# Patient Record
Sex: MALE | Race: WHITE
[De-identification: names, ages, dates, MRNs, and addresses within clinical notes are randomized per-mention and may not be internally consistent; named-entity substitution may affect disease eponyms.]

---

## 2017-04-05 ENCOUNTER — HOSPITAL ENCOUNTER (OUTPATIENT)
Dept: HOSPITAL 75 - CARD | Age: 52
End: 2017-04-05
Attending: INTERNAL MEDICINE
Payer: COMMERCIAL

## 2017-04-05 VITALS — DIASTOLIC BLOOD PRESSURE: 64 MMHG | SYSTOLIC BLOOD PRESSURE: 114 MMHG

## 2017-04-05 VITALS — DIASTOLIC BLOOD PRESSURE: 78 MMHG | SYSTOLIC BLOOD PRESSURE: 118 MMHG

## 2017-04-05 VITALS — SYSTOLIC BLOOD PRESSURE: 155 MMHG | DIASTOLIC BLOOD PRESSURE: 73 MMHG

## 2017-04-05 VITALS — DIASTOLIC BLOOD PRESSURE: 64 MMHG | SYSTOLIC BLOOD PRESSURE: 112 MMHG

## 2017-04-05 DIAGNOSIS — Z72.0: ICD-10-CM

## 2017-04-05 DIAGNOSIS — I10: ICD-10-CM

## 2017-04-05 DIAGNOSIS — I25.10: Primary | ICD-10-CM

## 2017-04-05 DIAGNOSIS — E78.2: ICD-10-CM

## 2017-04-05 DIAGNOSIS — R07.89: ICD-10-CM

## 2017-04-05 PROCEDURE — 93017 CV STRESS TEST TRACING ONLY: CPT

## 2017-04-05 PROCEDURE — 78452 HT MUSCLE IMAGE SPECT MULT: CPT

## 2017-04-05 PROCEDURE — 93306 TTE W/DOPPLER COMPLETE: CPT

## 2017-04-05 NOTE — ECHOCARDIOGRAPHY REPORT
PROCEDURE PHYSICIAN:   JACY BYNUM

 

DATE OF PROCEDURE:  

04/05/2017

 

TWO DIMENSIONAL ECHOCARDIOGRAM REPORT 

 

PRIMARY PHYSICIAN:       

OTHER PHYSICIAN:         

REFERRING PHYSICIAN:          

ORDERING PHYSICIAN:      

 

INDICATION FOR THE PROCEDURE:  Chest pain.

 

MEASUREMENTS                  DERIVED VALUES 

 

LV DIAMETER (LAX)   NORMALS                       NORMALS

Diastolic      4    (3.6-5.2)      Eject. Fract.  50% (60%+/-6%)

      

Systolic            (2.3-3.9)      Diastolic Vol.      

% Shortening        (0.22-0.42)    Systolic Vol.       

                                   Aortic Root         

IVS THICKNESS 

Diastolic      0.9  (0.6-1.1)

 

LVPW THICKNESS 

Diastolic      0.9  (0.6-1.1)

 

LA DIAMETER 

Systolic       2.2  (2.1-3.7)  

 

FINDINGS:

1.   Technical quality is good. 

2.   The left ventricle is normal in size with normal

contractility. Systolic function appeared to be normal. Estimated

ejection fraction 50%. 

3.   The left atrium is normal in size. No clot or thrombus were

seen within the left atrium. 

4.   The right atrium and right ventricle are normal in size. No

clot or thrombus were seen within the right side. 

5.   Mitral valve is normal in morphology with mild mitral

regurgitation noted by color Doppler flow. No mitral valve

prolapse. No mitral valve stenosis. 

6.   Aortic valve is trileaflet with normal opening and closing

pattern. No significant aortic stenosis or regurgitation was

seen. 

7.   Tricuspid valve is normal in morphology with mild tricuspid

regurgitation noted by color Doppler flow. Doppler across

tricuspid valve estimated pulmonary artery pressure of 6+ right

atrial pressure. 

8.   Pulmonic valve is functioning normally. 

9.   No pericardial effusion. 

 

CONCLUSION:

1.   Normal left ventricular size and systolic function.

Estimated ejection fraction 50%. 

2.   Mild mitral and tricuspid regurgitation. 

3.   Estimated pulmonary artery pressure of 15 mmHg.  

 

 

 

Job ID: [id number]

Dictated Date: 04/05/2017 14:36:56 

Transcription Date: 04/05/2017 15:00:01 / cha

## 2017-04-05 NOTE — STRESS TEST
PROCEDURE PHYSICIAN:   JACY BYNUM

 

DATE OF PROCEDURE:  

04/05/2017

 

EXERCISE MYOVIEW STRESS TEST REPORT 

 

BASELINE HEART RATE IS 71.  

BASELINE BLOOD PRESSURE: 107/71. 

BASELINE EKG: Sinus rhythm with no ischemic changes. 

 

SUMMARY:

The patient was injected with 10.89 mCi of technetium 99 Myoview

and the resting images were obtained. Then the patient started

exercising with a baseline heart rate, blood pressure and EKG

mentioned above. At minute 9 and 30 seconds, the patient was

injected with 32.5 mCi of technetium 99 Myoview. The patient was

able to finish a total of 10 minutes 30 seconds on standard Shane

protocol. With peak exercise level, EKG was showing minimal

nondiagnostic changes. Blood pressure was 154/73. During

recovery, heart rate and blood pressure returned to baseline. EKG

returned to baseline. 

 

The resting and stress images were reviewed in the short axis,

horizontal long axis, and vertical long axis views. Review of the

images showed diaphragmatic attenuation with typical male

pattern. Good radiotracer uptake with no significant ischemia or

infarction. SSS is 1, SDS 1, TID value 1. On the gated images,

the left ventricle appeared to be normal size with normal

contractility. Calculated ejection fraction 55%. 

 

CONCLUSION:

1.   Excellent exercise tolerance a total of 10 minutes 30

seconds on standard Shane protocol. Total of 12.8 METs, achieving

92% of maximum expected heart rate. 

2.   Appropriate heart rate and blood pressure response to

exercise. Returned to baseline during recovery. 

3.   Minimal nondiagnostic EKG changes with exercise. Returned to

baseline during recovery. 

4.   No ischemia or infarction on SPECT images. 

5.   Normal left ventricular size with normal contractility.

Calculated ejection fraction 55%. 

 

 

 

Job ID: [id number]

Dictated Date: 04/05/2017 14:04:52 

Transcription Date: 04/05/2017 14:51:50 / cha

## 2017-12-05 ENCOUNTER — HOSPITAL ENCOUNTER (OUTPATIENT)
Dept: HOSPITAL 75 - ER | Age: 52
Setting detail: OBSERVATION
Discharge: HOME | End: 2017-12-05
Attending: FAMILY MEDICINE | Admitting: FAMILY MEDICINE
Payer: SELF-PAY

## 2017-12-05 VITALS — WEIGHT: 143 LBS | HEIGHT: 67 IN | BODY MASS INDEX: 22.44 KG/M2

## 2017-12-05 VITALS — SYSTOLIC BLOOD PRESSURE: 108 MMHG | DIASTOLIC BLOOD PRESSURE: 74 MMHG

## 2017-12-05 VITALS — DIASTOLIC BLOOD PRESSURE: 71 MMHG | SYSTOLIC BLOOD PRESSURE: 106 MMHG

## 2017-12-05 DIAGNOSIS — I25.2: ICD-10-CM

## 2017-12-05 DIAGNOSIS — Z95.5: ICD-10-CM

## 2017-12-05 DIAGNOSIS — Z79.82: ICD-10-CM

## 2017-12-05 DIAGNOSIS — Z91.19: ICD-10-CM

## 2017-12-05 DIAGNOSIS — R07.9: Primary | ICD-10-CM

## 2017-12-05 DIAGNOSIS — F17.210: ICD-10-CM

## 2017-12-05 DIAGNOSIS — I25.10: ICD-10-CM

## 2017-12-05 DIAGNOSIS — E78.5: ICD-10-CM

## 2017-12-05 DIAGNOSIS — K21.9: ICD-10-CM

## 2017-12-05 DIAGNOSIS — I10: ICD-10-CM

## 2017-12-05 LAB
ALBUMIN SERPL-MCNC: 3.9 GM/DL (ref 3.2–4.5)
ALBUMIN SERPL-MCNC: 4.2 GM/DL (ref 3.2–4.5)
ALT SERPL-CCNC: 15 U/L (ref 0–55)
ALT SERPL-CCNC: 16 U/L (ref 0–55)
ANION GAP SERPL CALC-SCNC: 7 MMOL/L (ref 5–14)
ANION GAP SERPL CALC-SCNC: 8 MMOL/L (ref 5–14)
APTT BLD: 33 SEC (ref 24–35)
AST SERPL-CCNC: 16 U/L (ref 5–34)
AST SERPL-CCNC: 17 U/L (ref 5–34)
BASOPHILS # BLD AUTO: 0 10^3/UL (ref 0–0.1)
BASOPHILS # BLD AUTO: 0 10^3/UL (ref 0–0.1)
BASOPHILS NFR BLD AUTO: 0 % (ref 0–10)
BASOPHILS NFR BLD AUTO: 0 % (ref 0–10)
BILIRUB SERPL-MCNC: 0.3 MG/DL (ref 0.1–1)
BILIRUB SERPL-MCNC: 0.6 MG/DL (ref 0.1–1)
BUN SERPL-MCNC: 14 MG/DL (ref 7–18)
BUN SERPL-MCNC: 17 MG/DL (ref 7–18)
BUN/CREAT SERPL: 14
BUN/CREAT SERPL: 15
CALCIUM SERPL-MCNC: 9 MG/DL (ref 8.5–10.1)
CALCIUM SERPL-MCNC: 9.4 MG/DL (ref 8.5–10.1)
CHLORIDE SERPL-SCNC: 104 MMOL/L (ref 98–107)
CHLORIDE SERPL-SCNC: 106 MMOL/L (ref 98–107)
CHOLEST SERPL-MCNC: 148 MG/DL (ref ?–200)
CK SERPL-CCNC: 127 U/L (ref 30–200)
CO2 SERPL-SCNC: 26 MMOL/L (ref 21–32)
CO2 SERPL-SCNC: 27 MMOL/L (ref 21–32)
CREAT SERPL-MCNC: 0.95 MG/DL (ref 0.6–1.3)
CREAT SERPL-MCNC: 1.2 MG/DL (ref 0.6–1.3)
EOSINOPHIL # BLD AUTO: 0.1 10^3/UL (ref 0–0.3)
EOSINOPHIL # BLD AUTO: 0.1 10^3/UL (ref 0–0.3)
EOSINOPHIL NFR BLD AUTO: 1 % (ref 0–10)
EOSINOPHIL NFR BLD AUTO: 1 % (ref 0–10)
ERYTHROCYTE [DISTWIDTH] IN BLOOD BY AUTOMATED COUNT: 13.4 % (ref 10–14.5)
ERYTHROCYTE [DISTWIDTH] IN BLOOD BY AUTOMATED COUNT: 13.5 % (ref 10–14.5)
GFR SERPLBLD BASED ON 1.73 SQ M-ARVRAT: > 60 ML/MIN
GFR SERPLBLD BASED ON 1.73 SQ M-ARVRAT: > 60 ML/MIN
GLUCOSE SERPL-MCNC: 114 MG/DL (ref 70–105)
GLUCOSE SERPL-MCNC: 161 MG/DL (ref 70–105)
INR PPP: 0.9 (ref 0.8–1.4)
LDLC SERPL DIRECT ASSAY-MCNC: 110 MG/DL (ref 1–129)
LIPASE SERPL-CCNC: 27 U/L (ref 8–78)
LYMPHOCYTES # BLD AUTO: 2.4 X 10^3 (ref 1–4)
LYMPHOCYTES # BLD AUTO: 2.9 X 10^3 (ref 1–4)
LYMPHOCYTES NFR BLD AUTO: 27 % (ref 12–44)
LYMPHOCYTES NFR BLD AUTO: 28 % (ref 12–44)
MAGNESIUM SERPL-MCNC: 1.8 MG/DL (ref 1.8–2.4)
MCH RBC QN AUTO: 31 PG (ref 25–34)
MCH RBC QN AUTO: 31 PG (ref 25–34)
MCHC RBC AUTO-ENTMCNC: 34 G/DL (ref 32–36)
MCHC RBC AUTO-ENTMCNC: 35 G/DL (ref 32–36)
MCV RBC AUTO: 88 FL (ref 80–99)
MCV RBC AUTO: 89 FL (ref 80–99)
MONOCYTES # BLD AUTO: 0.5 X 10^3 (ref 0–1)
MONOCYTES # BLD AUTO: 0.7 X 10^3 (ref 0–1)
MONOCYTES NFR BLD AUTO: 5 % (ref 0–12)
MONOCYTES NFR BLD AUTO: 8 % (ref 0–12)
MYOGLOBIN SERPL-MCNC: 33.5 NG/ML (ref 10–92)
MYOGLOBIN SERPL-MCNC: 38.1 NG/ML (ref 10–92)
NEUTROPHILS # BLD AUTO: 5.8 X 10^3 (ref 1.8–7.8)
NEUTROPHILS # BLD AUTO: 6.7 X 10^3 (ref 1.8–7.8)
NEUTROPHILS NFR BLD AUTO: 64 % (ref 42–75)
NEUTROPHILS NFR BLD AUTO: 66 % (ref 42–75)
PLATELET # BLD: 173 10^3/UL (ref 130–400)
PLATELET # BLD: 186 10^3/UL (ref 130–400)
PMV BLD AUTO: 10 FL (ref 7.4–10.4)
PMV BLD AUTO: 9.9 FL (ref 7.4–10.4)
POTASSIUM SERPL-SCNC: 4 MMOL/L (ref 3.6–5)
POTASSIUM SERPL-SCNC: 4.6 MMOL/L (ref 3.6–5)
PROT SERPL-MCNC: 6.5 GM/DL (ref 6.4–8.2)
PROT SERPL-MCNC: 7.1 GM/DL (ref 6.4–8.2)
PROTHROMBIN TIME: 12.3 SEC (ref 12.2–14.7)
RBC # BLD AUTO: 4.82 10^6/UL (ref 4.35–5.85)
RBC # BLD AUTO: 4.99 10^6/UL (ref 4.35–5.85)
SODIUM SERPL-SCNC: 138 MMOL/L (ref 135–145)
SODIUM SERPL-SCNC: 140 MMOL/L (ref 135–145)
TRIGL SERPL-MCNC: 50 MG/DL (ref ?–150)
TROPONIN I SERPL-MCNC: < 0.3 NG/ML (ref ?–0.3)
VLDLC SERPL CALC-MCNC: 10 MG/DL (ref 5–40)
WBC # BLD AUTO: 10.2 10^3/UL (ref 4.3–11)
WBC # BLD AUTO: 9 10^3/UL (ref 4.3–11)

## 2017-12-05 PROCEDURE — 80061 LIPID PANEL: CPT

## 2017-12-05 PROCEDURE — 93005 ELECTROCARDIOGRAM TRACING: CPT

## 2017-12-05 PROCEDURE — 84484 ASSAY OF TROPONIN QUANT: CPT

## 2017-12-05 PROCEDURE — 83874 ASSAY OF MYOGLOBIN: CPT

## 2017-12-05 PROCEDURE — 93041 RHYTHM ECG TRACING: CPT

## 2017-12-05 PROCEDURE — 71010: CPT

## 2017-12-05 PROCEDURE — 36415 COLL VENOUS BLD VENIPUNCTURE: CPT

## 2017-12-05 PROCEDURE — 80053 COMPREHEN METABOLIC PANEL: CPT

## 2017-12-05 PROCEDURE — 83735 ASSAY OF MAGNESIUM: CPT

## 2017-12-05 PROCEDURE — 85379 FIBRIN DEGRADATION QUANT: CPT

## 2017-12-05 PROCEDURE — 85610 PROTHROMBIN TIME: CPT

## 2017-12-05 PROCEDURE — 83690 ASSAY OF LIPASE: CPT

## 2017-12-05 PROCEDURE — 85730 THROMBOPLASTIN TIME PARTIAL: CPT

## 2017-12-05 PROCEDURE — 82550 ASSAY OF CK (CPK): CPT

## 2017-12-05 PROCEDURE — 85025 COMPLETE CBC W/AUTO DIFF WBC: CPT

## 2017-12-05 RX ADMIN — NITROGLYCERIN PRN MG: 0.4 TABLET SUBLINGUAL at 10:04

## 2017-12-05 RX ADMIN — NITROGLYCERIN PRN MG: 0.4 TABLET SUBLINGUAL at 09:51

## 2017-12-05 NOTE — CLINIC ACCOUNT PROGRESS/DX
Clinic Account Progress/Dx


DIAGNOSIS:


Date Seen by Provider:  Dec 5, 2017


Time Seen by Provider:  16:57


Chest pain nonspecific etiology


Coronary artery disease


Hypertension


Hyperlipidemia 


Tobaccoism














JACY BYNUM MD Dec 5, 2017 16:57

## 2017-12-05 NOTE — CONSULTATION-CARDIOLOGY
HPI-Cardiology


Cardiology Consultation


Date of Consultation


17


Date of Admission





Time Seen by Provider:  15:18


Indication:  chest pain





HPI


52 years old gentleman with history of coronary artery disease, was in his 

usual state of health until this morning when he started having chest pain 

described it as dull in nature in the retrosternal area radiating to the left 

side.  Came into the emergency room and given sublingual nitroglycerin 2 pills 

relieved his chest pain and since then no further episodes were reported, 

denied any associated symptoms of shortness of breath, palpitation, syncope or 

near syncopal episodes.  Has been an active smoker.





Home Medications & Allergies


Allergies:  


Uncoded Allergies:  


     bee sting (Adverse Reaction, Intermediate, swelling, 13)


Home Medication List Reviewed:  Yes





PMH-Social-Family Hx


Patient Social History


Marital Status:  


Employed/Student:  employed


Alcohol Use:  Occasionally Uses


Recreational Drug Use:  No


Smoking Status:  Current Everyday Smoker


Type Used:  Cigarettes


Recent Foreign Travel:  No


Recent Infectious Disease Expo:  No


Physical Abuse Screen:  No


Sexual Abuse:  No





Immunizations Up To Date


Tetanus Booster (TDap):  Less than 5yrs





Past Medical History


past medical history as discussed below





Family Medical History


Significant Family History:  No Pertinent Family Hx


Family Medical Hx


noncontributory to her current condition





Constitutional:  no symptoms reported, see HPI


EENTM:  see HPI, no symptoms reported


Respiratory:  no symptoms reported, see HPI


Cardiovascular:  see HPI, chest pain, No edema, No Hx of Intervention, No 

palpitations, No syncope, No vascular heart diseas, No other


Gastrointestinal:  no symptoms reported, see HPI


Genitourinary:  no symptoms reported, see HPI


Musculoskeletal:  no symptoms reported, see HPI


Skin:  no symptoms reported, see HPI


Psychiatric/Neurological:  No Symptoms Reported, See HPI





Reviewed Test Results


Reviewed Test Results


Lab





Laboratory Tests








Test


  17


09:46 Range/Units


 


 


White Blood Count


  9.0 


  4.3-11.0


10^3/uL


 


Red Blood Count


  4.99 


  4.35-5.85


10^6/uL


 


Hemoglobin 15.5  13.3-17.7  G/DL


 


Hematocrit 44  40-54  %


 


Mean Corpuscular Volume 88  80-99  FL


 


Mean Corpuscular Hemoglobin 31  25-34  PG


 


Mean Corpuscular Hemoglobin


Concent 35 


  32-36  G/DL


 


 


Red Cell Distribution Width 13.4  10.0-14.5  %


 


Platelet Count


  186 


  130-400


10^3/uL


 


Mean Platelet Volume 9.9  7.4-10.4  FL


 


Neutrophils (%) (Auto) 64  42-75  %


 


Lymphocytes (%) (Auto) 27  12-44  %


 


Monocytes (%) (Auto) 8  0-12  %


 


Eosinophils (%) (Auto) 1  0-10  %


 


Basophils (%) (Auto) 0  0-10  %


 


Neutrophils # (Auto) 5.8  1.8-7.8  X 10^3


 


Lymphocytes # (Auto) 2.4  1.0-4.0  X 10^3


 


Monocytes # (Auto) 0.7  0.0-1.0  X 10^3


 


Eosinophils # (Auto)


  0.1 


  0.0-0.3


10^3/uL


 


Basophils # (Auto)


  0.0 


  0.0-0.1


10^3/uL


 


Prothrombin Time 12.3  12.2-14.7  SEC


 


INR Comment 0.9  0.8-1.4  


 


Activated Partial


Thromboplast Time 33 


  24-35  SEC


 


 


D-Dimer


  0.28 


  0.00-0.49


UG/ML


 


Sodium Level 138  135-145  MMOL/L


 


Potassium Level 4.0  3.6-5.0  MMOL/L


 


Chloride Level 104    MMOL/L


 


Carbon Dioxide Level 26  21-32  MMOL/L


 


Anion Gap 8  5-14  MMOL/L


 


Blood Urea Nitrogen 17  7-18  MG/DL


 


Creatinine


  1.20 


  0.60-1.30


MG/DL


 


Estimat Glomerular Filtration


Rate > 60 


   


 


 


BUN/Creatinine Ratio 14   


 


Glucose Level 161 H   MG/DL


 


Calcium Level 9.4  8.5-10.1  MG/DL


 


Magnesium Level 1.8  1.8-2.4  MG/DL


 


Total Bilirubin 0.3  0.1-1.0  MG/DL


 


Aspartate Amino Transf


(AST/SGOT) 17 


  5-34  U/L


 


 


Alanine Aminotransferase


(ALT/SGPT) 16 


  0-55  U/L


 


 


Alkaline Phosphatase 76    U/L


 


Myoglobin


  33.5 


  10.0-92.0


NG/ML


 


Troponin I < 0.30  <0.30  NG/ML


 


Total Protein 7.1  6.4-8.2  GM/DL


 


Albumin 4.2  3.2-4.5  GM/DL


 


Lipase 27  8-78  U/L











Physical Exam


Vital Signs





Vital Sign - Last 12Hours








 17





 09:39


 


Temp 98.0


 


Pulse 70


 


Resp 18


 


B/P (MAP) 127/74 (91)


 


Pulse Ox 98


 


O2 Delivery Room Air





Capillary Refill : Less Than 3 Seconds


General Appearance:  No Apparent Distress, WD/WN


Eyes:  Bilateral Eye Normal Inspection, Bilateral Eye PERRL, Bilateral Eye EOMI


HEENT:  PERRL/EOMI, TMs Normal, Normal ENT Inspection, Pharynx Normal


Neck:  Full Range of Motion, Normal Inspection, Non Tender, Supple, Carotid 

Bruit


Respiratory:  Chest Non Tender, Lungs Clear, Normal Breath Sounds, No Accessory 

Muscle Use, No Respiratory Distress


Cardiovascular:  Regular Rate, Rhythm, No Edema, No Gallop, No JVD, No Murmur, 

Normal Peripheral Pulses


Gastrointestinal:  Normal Bowel Sounds, No Organomegaly, No Pulsatile Mass, Non 

Tender, Soft


Back:  Normal Inspection, No CVA Tenderness, No Vertebral Tenderness


Extremity:  Normal Capillary Refill, Normal Inspection, Normal Range of Motion, 

Non Tender, No Calf Tenderness, No Pedal Edema


Neurologic/Psychiatric:  Alert, Oriented x3, No Motor/Sensory Deficits, Normal 

Mood/Affect


Skin:  Normal Color, Warm/Dry


Lymphatic:  No Adenopathy





A/P-Cardiology


Admission Diagnosis


Chest pain nonspecific etiology


Coronary artery disease


Hypertension


Hyperlipidemia 


Tobaccoism








Assessment/Plan


Chest pain nonspecific etiology, reporting improvement, EKG and cardiac enzymes 

has been normal.  I will evaluate the second set of cardiac enzyme if it is 

negative will discharge home.





Coronary artery disease, history of Promus element stent 2.25 X 8mm in the left 

circumflex artery, at the bifurcation point, done by Dr. Queen.  Repeat cardiac 

catheterization showed struts of that stent protruding at the bifurcation point

, stress test done in 2017 showing excellent exercise tolerance for a 

total of 10 minutes and 15 seconds on Sahne protocol with no ischemia or 

infarction, normal LV function. I recommended conservative management at that 

point.  Continue to monitor at this time.





Hypertension, controlled on current medication, no changes are recommended 

continue to monitor blood pressure, educated on compliance with medication





Hyperlipidemia, maintained on Lipitor, educated about compliance with medication

, continue to monitor lipids





Tobaccoism, patient was educated on smoking cessation





Noncompliance with medication, patient was educated in length about compliance 

with medicine





Clinical Quality Measures


AMI/AHF:


ASA po Prior to arrival:  No





DVT/VTE Risk/Contraindication:


Risk Factor Score Per Nursin


RFS Level Per Nursing on Admit:  4+=Very High











JACY BYNUM MD Dec 5, 2017 3:24 pm

## 2017-12-05 NOTE — ED CHEST PAIN
General


Chief Complaint:  Chest Pain


Stated Complaint:  CP,SOA


Nursing Triage Note:  


AMB TO ROOM C/O  CHEST PAIN 3 HRS PTA. WITH NAUSEA AND RADIATION TO R SHOULDER.


Nursing Sepsis Screen:  No Definite Risk


Source:  patient


Exam Limitations:  no limitations





History of Present Illness


Time seen by provider:  09:50


Initial Comments


Here with report of chest pain that started about 2 hours ago at 8 a.m. when he 

was trimming trees.  Has significant cardiac history including 3 stents per the 

patient.  He has shortness of air and some nausea but no vomiting.  Has not 

taken nitroglycerin this morning.  He does smoke.  Denies any recent injury, 

long trips or surgeries.


Timing/Duration:  1-3 hours


Severity/Quality:  moderate


Location:  central


Radiation:  neck, shoulders


Activities at Onset:  activity


Prior CP/Workup:  cardiac cath, echocardiography, stress test


Modifying Factors:  worse with exercise, improves with rest


ASA po PTA:  No


NTG SL PTA:  No


Associated Symptoms:  No abdominal pain, No back pain, No edema, No fever/chills

, nausea/vomiting, No weakness





Allergies and Home Medications


Allergies


Uncoded Allergies:  


     bee sting (Adverse Reaction, Intermediate, swelling, 13)





Home Medications


Aspirin 81 Mg Tabec, 81 MG PO DAILY, (Reported)





Review of Systems


Constitutional:  see HPI, No chills, No fever


EENTM:  No Symptoms Reported


Respiratory:  No Symptoms Reported, Denies Cough, Shortness of Air


Cardiovascular:  Chest Pain, Denies Edema


Gastrointestinal:  Denies Abdominal Pain, Nausea, Denies Vomiting


Genitourinary:  No Symptoms Reported


Musculoskeletal:  no symptoms reported


Skin:  no symptoms reported


Psychiatric/Neurological:  No Symptoms Reported





All Other Systems Reviewed


Negative Unless Noted:  Yes





Past Medical-Social-Family Hx


Patient Social History


Alcohol Use:  Denies Use


Recreational Drug Use:  No


Smoking Status:  Current Everyday Smoker


Recent Foreign Travel:  No


Contact w/Someone Who Travel:  No


Recent Infectious Disease Expo:  No





Immunizations Up To Date


Tetanus Booster (TDap):  Less than 5yrs





Seasonal Allergies


Seasonal Allergies:  No





Surgeries


History of Surgeries:  Yes (KNEE, FINGER, RIGHT HAND SURGERY)


Surgeries:  Cardiac, Coronary Stent, Orthopedic





Respiratory


History of Respiratory Disorde:  No





Cardiovascular


History of Cardiac Disorders:  Yes (STENT X 1)


Cardiac Disorders:  Coronary Artery Disease, Heart Attack, High Cholesterol





Neurological


History of Neurological Disord:  No





Reproductive System


Hx Reproductive Disorders:  No





Gastrointestinal


History of Gastrointestinal Di:  Yes


Gastrointestinal Disorders:  Gastroesophageal Reflux





Musculoskeletal


History of Musculoskeletal Dis:  No





Endocrine


History of Endocrine Disorders:  No





Cancer


History of Cancer:  No





Psychosocial


History of Psychiatric Problem:  No





Integumentary


History of Skin or Integumenta:  No





Blood Transfusions


History of Blood Disorders:  No


Adverse Reaction to a Blood Tr:  No





Reviewed Nursing Assessment


Reviewed/Agree w Nursing PMH:  Yes





Family Medical History


Significant Family History:  No Pertinent Family Hx





Physical Exam


Vital Signs





Vital Sign - Last 12Hours








 17





 09:39


 


Temp 98.0


 


Pulse 70


 


Resp 18


 


B/P (MAP) 127/74 (91)


 


Pulse Ox 98


 


O2 Delivery Room Air





Capillary Refill : Less Than 3 Seconds


General Appearance:  No Apparent Distress, WD/WN


HEENT:  PERRL/EOMI, Pharynx Normal


Neck:  Non Tender, Supple


Respiratory:  Lungs Clear, Normal Breath Sounds


Cardiovascular:  Regular Rate, Rhythm, No Murmur


Gastrointestinal:  Non Tender, Soft


Extremity:  Normal Range of Motion, Non Tender, No Calf Tenderness


Neurologic/Psychiatric:  Alert, Oriented x3


Skin:  Normal Color, Warm/Dry





Progress/Results/Core Measures


Results/Orders


Lab Results





Laboratory Tests








Test


  17


09:46 Range/Units


 


 


White Blood Count


  9.0 


  4.3-11.0


10^3/uL


 


Red Blood Count


  4.99 


  4.35-5.85


10^6/uL


 


Hemoglobin 15.5  13.3-17.7  G/DL


 


Hematocrit 44  40-54  %


 


Mean Corpuscular Volume 88  80-99  FL


 


Mean Corpuscular Hemoglobin 31  25-34  PG


 


Mean Corpuscular Hemoglobin


Concent 35 


  32-36  G/DL


 


 


Red Cell Distribution Width 13.4  10.0-14.5  %


 


Platelet Count


  186 


  130-400


10^3/uL


 


Mean Platelet Volume 9.9  7.4-10.4  FL


 


Neutrophils (%) (Auto) 64  42-75  %


 


Lymphocytes (%) (Auto) 27  12-44  %


 


Monocytes (%) (Auto) 8  0-12  %


 


Eosinophils (%) (Auto) 1  0-10  %


 


Basophils (%) (Auto) 0  0-10  %


 


Neutrophils # (Auto) 5.8  1.8-7.8  X 10^3


 


Lymphocytes # (Auto) 2.4  1.0-4.0  X 10^3


 


Monocytes # (Auto) 0.7  0.0-1.0  X 10^3


 


Eosinophils # (Auto)


  0.1 


  0.0-0.3


10^3/uL


 


Basophils # (Auto)


  0.0 


  0.0-0.1


10^3/uL


 


Prothrombin Time 12.3  12.2-14.7  SEC


 


INR Comment 0.9  0.8-1.4  


 


Activated Partial


Thromboplast Time 33 


  24-35  SEC


 


 


D-Dimer


  0.28 


  0.00-0.49


UG/ML


 


Sodium Level 138  135-145  MMOL/L


 


Potassium Level 4.0  3.6-5.0  MMOL/L


 


Chloride Level 104    MMOL/L


 


Carbon Dioxide Level 26  21-32  MMOL/L


 


Anion Gap 8  5-14  MMOL/L


 


Blood Urea Nitrogen 17  7-18  MG/DL


 


Creatinine


  1.20 


  0.60-1.30


MG/DL


 


Estimat Glomerular Filtration


Rate > 60 


   


 


 


BUN/Creatinine Ratio 14   


 


Glucose Level 161 H   MG/DL


 


Calcium Level 9.4  8.5-10.1  MG/DL


 


Magnesium Level 1.8  1.8-2.4  MG/DL


 


Total Bilirubin 0.3  0.1-1.0  MG/DL


 


Aspartate Amino Transf


(AST/SGOT) 17 


  5-34  U/L


 


 


Alanine Aminotransferase


(ALT/SGPT) 16 


  0-55  U/L


 


 


Alkaline Phosphatase 76    U/L


 


Myoglobin


  33.5 


  10.0-92.0


NG/ML


 


Troponin I < 0.30  <0.30  NG/ML


 


Total Protein 7.1  6.4-8.2  GM/DL


 


Albumin 4.2  3.2-4.5  GM/DL


 


Lipase 27  8-78  U/L








My Orders





Orders - SHARON HOLCOMB MD


Ekg Tracing (17 09:41)


Cbc With Automated Diff (17 09:42)


Magnesium (17 09:42)


Chest 1 View, Ap/Pa Only (17 09:42)


Cardiac Profile 1 (17 09:42)


Comprehensive Metabolic Panel (17 09:42)


Myoglobin Serum (17 09:42)


Protime With Inr (17 09:42)


Partial Thromboplastin Time (17 09:42)


O2 (17 09:42)


Monitor-Rhythm Ecg Trace Only (17 09:42)


Lipid Panel (17 06:00)


Aspirin Chewable Tablet (Baby Aspirin Ch (17 09:45)


Nitroglycerin 0.4 Mg Btl 25's (Nitrostat (17 09:45)


Saline Lock/Iv-Start (17 09:42)


Lipase (17 09:42)


Fibrin Degradation Products (17 09:42)





Medications Given in ED





Current Medications








 Medications  Dose


 Ordered  Sig/Ila


 Route  Start Time


 Stop Time Status Last Admin


Dose Admin


 


 Aspirin  324 mg  ONCE  ONCE


 PO  17 09:45


 17 09:46 DC 17 09:51


324 MG


 


 Nitroglycerin  0.4 mg  UD  PRN


 SL  17 09:45


    17 10:04


0.4 MG








Vital Signs/I&O





Vital Sign - Last 12Hours








 17





 09:39 10:25


 


Temp 98.0 


 


Pulse 70 66


 


Resp 18 18


 


B/P (MAP) 127/74 (91) 108/74 (85)


 


Pulse Ox 98 98


 


O2 Delivery Room Air Room Air














Blood Pressure Mean:  85








Progress Note :  


Progress Note


Seen and evaluated.  IV, labs, chest x-ray, EKG,  mg by mouth, 

nitroglycerin sublingual ordered.  Ultimately pain resolved after 2 sublingual 

nitroglycerin.  Labs pending.  Monitor patient.  1052: I did discuss the case 

with Dr. Nation and he accepts the patient for consult.  1054: I have discussed 

case with Dr. Whalen and she accepts patient for [place], observation status.  

Patient agrees with plan.  Remains pain free.





ECG


Initial ECG Impression Date:  Dec 5, 2017


Initial ECG Impression Time:  09:39


Initial ECG Rate:  74


Initial ECG Rhythm:  Normal Sinus


Comment


Sinus rhythm with normal axis.  No evidence of ST elevation MI.  Similar to 

previous of 2015.  Interpreted by me.





Diagnostic Imaging





   Diagonstic Imaging:  Xray


   Plain Films/CT/US/NM/MRI:  chest


Comments


NAME:      MICHAEL BUTLER LEELA [facility] REC#:   [id number]


ACCOUNT#:   [id number]


PT STATUS:   REG ER


:      1965


PHYSICIAN:    SHARON HOLCOMB MD


ADMIT DATE:   17/ER


 ***Signed***


Date of Exam:   17





CHEST 1 VIEW, AP/PA ONLY


 





EXAMINATION:


Upright radiograph of the chest.





INDICATION: 


Chest pain and shortness of breath.





COMPARISON: 


2015.





FINDINGS:


The lungs are clear of focal infiltrate. Chronic appearing mild


interstitial prominence is seen. The heart size is normal. No


effusion or pneumothorax.





The mediastinum and red appear unremarkable.





IMPRESSION: 


No acute process.





Dictated by: 





  Dictated on workstation # [id number]





[id number]





Dict:      17 1006


Trans:      17 1017





Interpreted by:         CAROLYN WILDER MD


Electronically signed by:   CAROLYN WILDER MD 17 1017





Departure


Communication (Admissions)


Time/Spoke to Admitting Phy:  10:54


Time/Spoke to Consulting Phy:  10:52





Impression


Impression:  


 Primary Impression:  


 Chest pain


 Qualified Codes:  R07.9 - Chest pain, unspecified


Disposition:   ADMITTED AS INPATIENT


Condition:  Stable





Admissions


Decision to Admit Reason:  Admit from ER (General)


Decision to Admit/Date:  Dec 5, 2017


Time/Decision to Admit Time:  10:52





Departure-Patient Inst.


Referrals:  


NO,LOCAL PHYSICIAN (PCP/Family)


Primary Care Physician











SHARON HOLCOMB MD Dec 5, 2017 10:40

## 2017-12-05 NOTE — DIAGNOSTIC IMAGING REPORT
EXAMINATION:

Upright radiograph of the chest.



INDICATION: 

Chest pain and shortness of breath.



COMPARISON: 

03/29/2015.



FINDINGS:

The lungs are clear of focal infiltrate. Chronic appearing mild

interstitial prominence is seen. The heart size is normal. No

effusion or pneumothorax.



The mediastinum and red appear unremarkable.



IMPRESSION: 

No acute process.



Dictated by: 



  Dictated on workstation # [id number]

## 2019-02-16 ENCOUNTER — HOSPITAL ENCOUNTER (INPATIENT)
Dept: HOSPITAL 75 - ER | Age: 54
LOS: 2 days | Discharge: HOME | DRG: 247 | End: 2019-02-18
Attending: FAMILY MEDICINE | Admitting: FAMILY MEDICINE
Payer: SELF-PAY

## 2019-02-16 VITALS — SYSTOLIC BLOOD PRESSURE: 111 MMHG | DIASTOLIC BLOOD PRESSURE: 69 MMHG

## 2019-02-16 VITALS — BODY MASS INDEX: 24.81 KG/M2 | WEIGHT: 145.31 LBS | HEIGHT: 64 IN

## 2019-02-16 VITALS — SYSTOLIC BLOOD PRESSURE: 94 MMHG | DIASTOLIC BLOOD PRESSURE: 62 MMHG

## 2019-02-16 DIAGNOSIS — K21.9: ICD-10-CM

## 2019-02-16 DIAGNOSIS — I21.4: Primary | ICD-10-CM

## 2019-02-16 DIAGNOSIS — F17.210: ICD-10-CM

## 2019-02-16 DIAGNOSIS — Z79.82: ICD-10-CM

## 2019-02-16 DIAGNOSIS — E78.5: ICD-10-CM

## 2019-02-16 DIAGNOSIS — I25.2: ICD-10-CM

## 2019-02-16 DIAGNOSIS — I50.22: ICD-10-CM

## 2019-02-16 DIAGNOSIS — I25.10: ICD-10-CM

## 2019-02-16 DIAGNOSIS — Z95.5: ICD-10-CM

## 2019-02-16 DIAGNOSIS — Z82.49: ICD-10-CM

## 2019-02-16 LAB
ALBUMIN SERPL-MCNC: 4.2 GM/DL (ref 3.2–4.5)
ALP SERPL-CCNC: 74 U/L (ref 40–136)
ALT SERPL-CCNC: 23 U/L (ref 0–55)
APTT BLD: 35 SEC (ref 24–35)
BASOPHILS # BLD AUTO: 0 10^3/UL (ref 0–0.1)
BASOPHILS NFR BLD AUTO: 0 % (ref 0–10)
BILIRUB SERPL-MCNC: 0.2 MG/DL (ref 0.1–1)
BUN/CREAT SERPL: 10
CALCIUM SERPL-MCNC: 9.1 MG/DL (ref 8.5–10.1)
CHLORIDE SERPL-SCNC: 101 MMOL/L (ref 98–107)
CO2 SERPL-SCNC: 23 MMOL/L (ref 21–32)
CREAT SERPL-MCNC: 1.04 MG/DL (ref 0.6–1.3)
EOSINOPHIL # BLD AUTO: 0.1 10^3/UL (ref 0–0.3)
EOSINOPHIL NFR BLD AUTO: 0 % (ref 0–10)
ERYTHROCYTE [DISTWIDTH] IN BLOOD BY AUTOMATED COUNT: 13.7 % (ref 10–14.5)
GFR SERPLBLD BASED ON 1.73 SQ M-ARVRAT: > 60 ML/MIN
GLUCOSE SERPL-MCNC: 149 MG/DL (ref 70–105)
HCT VFR BLD CALC: 42 % (ref 40–54)
HGB BLD-MCNC: 14.9 G/DL (ref 13.3–17.7)
INR PPP: 1 (ref 0.8–1.4)
LYMPHOCYTES # BLD AUTO: 2.6 X 10^3 (ref 1–4)
LYMPHOCYTES NFR BLD AUTO: 20 % (ref 12–44)
MAGNESIUM SERPL-MCNC: 2 MG/DL (ref 1.8–2.4)
MANUAL DIFFERENTIAL PERFORMED BLD QL: NO
MCH RBC QN AUTO: 31 PG (ref 25–34)
MCHC RBC AUTO-ENTMCNC: 35 G/DL (ref 32–36)
MCV RBC AUTO: 89 FL (ref 80–99)
MONOCYTES # BLD AUTO: 0.6 X 10^3 (ref 0–1)
MONOCYTES NFR BLD AUTO: 5 % (ref 0–12)
MYOGLOBIN SERPL-MCNC: 36.3 NG/ML (ref 10–92)
NEUTROPHILS # BLD AUTO: 9.6 X 10^3 (ref 1.8–7.8)
NEUTROPHILS NFR BLD AUTO: 75 % (ref 42–75)
PLATELET # BLD: 264 10^3/UL (ref 130–400)
PMV BLD AUTO: 9.7 FL (ref 7.4–10.4)
POTASSIUM SERPL-SCNC: 4.3 MMOL/L (ref 3.6–5)
PROT SERPL-MCNC: 6.9 GM/DL (ref 6.4–8.2)
PROTHROMBIN TIME: 12.8 SEC (ref 12.2–14.7)
SODIUM SERPL-SCNC: 135 MMOL/L (ref 135–145)
WBC # BLD AUTO: 12.9 10^3/UL (ref 4.3–11)

## 2019-02-16 PROCEDURE — 36415 COLL VENOUS BLD VENIPUNCTURE: CPT

## 2019-02-16 PROCEDURE — 93458 L HRT ARTERY/VENTRICLE ANGIO: CPT

## 2019-02-16 PROCEDURE — 80061 LIPID PANEL: CPT

## 2019-02-16 PROCEDURE — 83874 ASSAY OF MYOGLOBIN: CPT

## 2019-02-16 PROCEDURE — 83735 ASSAY OF MAGNESIUM: CPT

## 2019-02-16 PROCEDURE — 85027 COMPLETE CBC AUTOMATED: CPT

## 2019-02-16 PROCEDURE — 84484 ASSAY OF TROPONIN QUANT: CPT

## 2019-02-16 PROCEDURE — 85610 PROTHROMBIN TIME: CPT

## 2019-02-16 PROCEDURE — 85025 COMPLETE CBC W/AUTO DIFF WBC: CPT

## 2019-02-16 PROCEDURE — 80053 COMPREHEN METABOLIC PANEL: CPT

## 2019-02-16 PROCEDURE — 93306 TTE W/DOPPLER COMPLETE: CPT

## 2019-02-16 PROCEDURE — 80048 BASIC METABOLIC PNL TOTAL CA: CPT

## 2019-02-16 PROCEDURE — 84443 ASSAY THYROID STIM HORMONE: CPT

## 2019-02-16 PROCEDURE — 85730 THROMBOPLASTIN TIME PARTIAL: CPT

## 2019-02-16 PROCEDURE — 93041 RHYTHM ECG TRACING: CPT

## 2019-02-16 PROCEDURE — 71045 X-RAY EXAM CHEST 1 VIEW: CPT

## 2019-02-16 PROCEDURE — 93005 ELECTROCARDIOGRAM TRACING: CPT

## 2019-02-16 NOTE — XMS REPORT
Continuity of Care Document

 Created on: 2019



MICHAEL BUTLER

External Reference #: [id number]

: 1965

Sex: Male



Demographics







 Address  [facility] [address]  [address]

 

 Home Phone  [phone] x

 

 Preferred Language  Unknown

 

 Marital Status  Unknown

 

 Mu-ism Affiliation  Unknown

 

 Race  Unknown

 

 Ethnic Group  Unknown





Author







 Author  [facility]

 

 Address  Unknown

 

 Phone  Unavailable



              



Allergies

      





 Active            Description            Code            Type            
Severity            Reaction            Onset            Reported/Identified   
         Relationship to Patient            Clinical Status        

 

 Yes            bee sting            bee sting                         Moderate
            swelling                         2013                        
          



                  



Medications

      



There is no data.                  



Problems

      





 Date Dx Coded            Attending            Type            Code            
Diagnosis            Diagnosed By        

 

 2012            MILAN AKBAR DO                         611.1          
  HYPERTROPHY OF BREAST                     

 

 2012            TERESITA MARISCAL                         611.1 
           HYPERTROPHY OF BREAST                     

 

 2012            MILAN AKBAR DO                         611.1          
  HYPERTROPHY OF BREAST                     

 

 2012            SHYANNE LEYVA MD                         611.1         
   HYPERTROPHY OF BREAST                     

 

 2012            TEODORO FRANCO                         611.1     
       HYPERTROPHY OF BREAST                     

 

 2012            MILAN AKBAR DO                         611.1          
  HYPERTROPHY OF BREAST                     

 

 2012            MILAN AKBAR DO                         786.50         
   CHEST PAIN                     

 

 2012            TERESITA MARISCAL                         786.50
            CHEST PAIN                     

 

 2012            MILAN AKBAR DO                         786.50         
   CHEST PAIN                     

 

 2012            SHYANNE LEYVA MD                         786.50        
    CHEST PAIN                     

 

 2012            TEODORO FRANCO                         786.50    
        CHEST PAIN                     

 

 2012            MILAN AKBAR DO                         786.50         
   CHEST PAIN                     

 

 09/15/2012            MILAN AKBAR DO                         522.5          
  PERIAPICAL ABSCESS WITHOUT SINUS                     

 

 09/15/2012            TERESITA MARISCAL                         522.5 
           PERIAPICAL ABSCESS WITHOUT SINUS                     

 

 09/15/2012            MILAN AKBAR DO                         522.5          
  PERIAPICAL ABSCESS WITHOUT SINUS                     

 

 09/15/2012            SHYANNE LEYVA MD                         522.5         
   PERIAPICAL ABSCESS WITHOUT SINUS                     

 

 09/15/2012            TEODORO FRANCO                         522.5     
       PERIAPICAL ABSCESS WITHOUT SINUS                     

 

 09/15/2012            MILAN AKBAR DO                         522.5          
  PERIAPICAL ABSCESS WITHOUT SINUS                     

 

 2013            MILAN AKBAR DO                         780.4          
  DIZZINESS AND VERTIGO                     

 

 2013            BRITTON APRN, TERESITA R                         780.4 
           DIZZINESS AND VERTIGO                     

 

 2013            MILAN AKBAR DO K                         780.4          
  DIZZINESS AND VERTIGO                     

 

 2013            SHYANNE LEYVA MD                         780.4         
   DIZZINESS AND VERTIGO                     

 

 2013            TEODORO FRANCO R                         780.4     
       DIZZINESS AND VERTIGO                     

 

 2013            MILAN AKBAR DO K                         780.4          
  DIZZINESS AND VERTIGO                     

 

 2013                         Ot            305.1            TOBACCO USE 
DISORDER                     

 

 2013                         Ot            414.01            CORONARY 
ATHEROSCLEROSIS OF NATIVE CORON                     

 

 2013            TERESITA MARISCAL R                         989.5 
           TOXIC REACTION TO VENOM OF SPIDER                     

 

 2013            MILAN AKBAR DO K                         989.5          
  TOXIC REACTION TO VENOM OF SPIDER                     

 

 2013            SHYANNE LEYVA MD                         989.5         
   TOXIC REACTION TO VENOM OF SPIDER                     

 

 2013            TEODORO FRANCO R                         989.5     
       TOXIC REACTION TO VENOM OF SPIDER                     

 

 2013            MILAN AKBAR DO K                         989.5          
  TOXIC REACTION TO VENOM OF SPIDER                     

 

 2013            JACY NATION MD            Ot            305.1       
     TOBACCO USE DISORDER                     

 

 2013            JACY NATION MD            Ot            411.1       
     INTERMED CORONARY SYND                     

 

 2013            JACY NATION MD            Ot            414.01      
      CORONARY ATHEROSCLEROSIS OF NATIVE CORON                     

 

 2013            JACY NATION MD            Ot            V15.81      
      HX OF PAST NONCOMPLIANCE                     

 

 2013            JACY NATION MD            Ot            V45.82      
      PERCUTANEOUS TRANSLUM CORON ANGIOPLASTY                      

 

 2013            JACY NATION MD            Ot            272.4       
     HYPERLIPIDEMIA NEC/NOS                     

 

 2013            JACY NATION MD            Ot            305.1       
     TOBACCO USE DISORDER                     

 

 2013            JACY NATION MD            Ot            401.9       
     HYPERTENSION NOS                     

 

 2013            JACY NATION MD            Ot            410.72      
      AC MYOCARD INFARCT,SUBENDO INFARCT,SUBSE                     

 

 2013            JACY NATION MD            Ot            414.01      
      CORONARY ATHEROSCLEROSIS OF NATIVE CORON                     

 

 2013            JACY NATION MD            Ot            425.4       
     PRIM CARDIOMYOPATHY NEC                     

 

 2013            JACY NATION MD            Ot            428.0       
     CONGESTIVE HEART FAILURE NOS                     

 

 2013            JACY NATION MD            Ot            428.22      
      CHRONIC SYSTOLIC HRT FAILURE                     

 

 2013            JACY NATION MD            Ot            458.9       
     HYPOTENSION NOS                     

 

 2013            JACY NATION MD            Ot            786.59      
      CHEST PAIN NEC                     

 

 2013            JACY NATION MD            Ot            V15.81      
      HX OF PAST NONCOMPLIANCE                     

 

 2013            JACY NATION MD            Ot            V45.82      
      PERCUTANEOUS TRANSLUM CORON ANGIOPLASTY                      

 

 2013            JACY NATION MD            Ot            V58.63      
      LONG-TERM(CURRENT)USE OF ANTIPLATELET/AN                     

 

 2013            JACY NATION MD            Ot            V58.66      
      LONG-TERM (CURRENT) USE OF ASPIRIN                     

 

 2013            JACY NATION MD            Ot            272.4       
     HYPERLIPIDEMIA NEC/NOS                     

 

 2013            JACY NATION MD            Ot            275.41      
      HYPOCALCEMIA                     

 

 2013            JACY NATION MD            Ot            305.1       
     TOBACCO USE DISORDER                     

 

 2013            JACY NATION MD            Ot            413.9       
     ANGINA PECTORIS NEC/NOS                     

 

 2013            JACY NATION MD            Ot            414.01      
      CORONARY ATHEROSCLEROSIS OF NATIVE CORON                     

 

 2013            JACY NATION MD            Ot            780.2       
     SYNCOPE AND COLLAPSE                     

 

 2013            JACY NATION MD            Ot            780.4       
     DIZZINESS AND GIDDINESS                     

 

 2013            JACY NATION MD            Ot            V17.3       
     FAM HX-ISCHEM HEART DIS                     

 

 2013            JACY NATION MD, Ot            V45.82      
      PERCUTANEOUS TRANSLUM CORON ANGIOPLASTY                      

 

 2013            MILAN AKBAR DO                         414.00         
   CAD                     

 

 2013            MILAN AKBAR DO K                         786.59         
   OTHER CHEST PAIN                     

 

 2013            SHYANNE LEYVA MD                         414.00        
    CAD                     

 

 2013            SHYANNE LEYVA MD                         786.59        
    OTHER CHEST PAIN                     

 

 2013            TEODORO FRANCO                         414.00    
        CAD                     

 

 2013            TEODORO FRANCO                         786.59    
        OTHER CHEST PAIN                     

 

 2013            MILAN AKBAR DO K                         414.00         
   CAD                     

 

 2013            MILAN AKBAR DO K                         786.59         
   OTHER CHEST PAIN                     

 

 2013            SWATI CONLEY MD            Ot            786.09  
          RESPIRATORY ABNORM NEC                     

 

 2013            SWATI CONLEY MD            Ot            786.50  
          CHEST PAIN NOS                     

 

 2014            TEODORO FRANCO                         V70.5     
       EXAM - PRE-EMPLOYMENT                     

 

 2014            MILAN AKBAR DO                         V70.5          
  EXAM - PRE-EMPLOYMENT                     

 

 2014            MILAN AKBAR DO                         461.9          
  SINUSITIS ACUTE                     

 

 2014            MILAN AKBAR DO                         466.0          
  BRONCHITIS, ACUTE                     

 

 2014            MILAN AKBAR DO                         V65.42         
   COUNSELING - SMOKING CESSATION                     

 

 2014                         Ot            786.09                       
           

 

 2014                         Ot            786.50                       
           

 

 2015                         Ot            786.09                       
           

 

 2015                         Ot            786.50                       
           

 

 2015            STEFAN MORALESC, LES FACP CCDS            Ot            
272.4            HYPERLIPIDEMIA NEC/NOS                     

 

 2015            STEFAN DOUGLAS FACC, ALI FACP CCDS            Ot            
305.1            TOBACCO USE DISORDER                     

 

 2015            STEFAN DOUGLAS FACC, ALI FACP CCDS            Ot            
401.9            HYPERTENSION NOS                     

 

 2015            STEFAN DOUGLAS FACC, ALI FACP CCDS            Ot            
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                     

 

 2015            STEFAN DOUGLAS FACC, ALI FACP CCDS            Ot            
414.01            CORONARY ATHEROSCLEROSIS OF NATIVE CORON                     

 

 2015            STEFAN DOUGLAS FACC, ALI FACP CCDS            Ot            
786.50            CHEST PAIN NOS                     

 

 2015            STEFAN DOUGLAS FACC, LES FACP CCDS            Ot            
V15.81            HX OF PAST NONCOMPLIANCE                     

 

 2015            STEFAN DOUGLAS FACC, ALI FACP CCDS            Ot            
V45.82            PERCUTANEOUS TRANSLUM CORON ANGIOPLASTY                      

 

 2015                         Ot            786.09                       
           

 

 2015                         Ot            786.50                       
           

 

 2015            VERONIQUE DARNELL            Ot            
272.4                                  

 

 2015            VERONIQUE DARNELL            Ot            
305.1                                  

 

 2015            VERONIQUE DARNELL            Ot            
401.9                                  

 

 2015            VERONIQUE DARNELL            Ot            
414.00                                  

 

 2015                         Ot            786.09                       
           

 

 2015                         Ot            786.50                       
           

 

 2015            VERONIQUE DARNELL            Ot            
272.4                                  

 

 2015            VERONIQUE DARNELL            Ot            
305.1                                  

 

 2015            VERONIQUE DARNELL            Ot            
401.9                                  

 

 2015            VERONIQUE DARNELL            Ot            
414.00                                  

 

 2015                         Ot            786.09                       
           

 

 2015                         Ot            786.50                       
           

 

 2015            JOHAN-KENDALL PA, VERONIQUE K            Ot            
272.4                                  

 

 2015            PRADO-KENDALL PA, VERONIQUE K            Ot            
305.1                                  

 

 2015            PRADO-KENDALL PA, VERONIQUE K            Ot            
401.9                                  

 

 2015            PRADO-KENDALL PA, VERONIQUE K            Ot            
414.00                                  

 

 2015            PRADO-KENDALL PA, VERONIQUE K            Ot            
272.4                                  

 

 2015            PRADO-KENDALL PA, VERONIQUE K            Ot            
305.1                                  

 

 2015            PRADO-KENDALL PA, VERONIQUE K            Ot            
401.9                                  

 

 2015            PRADO-KENDALL PA, VERONIQUE K            Ot            
414.00                                  

 

 2015            PRADO-KENDALL PA, VERONIQUE K            Ot            
272.4                                  

 

 2015            PRADO-KENDALL PA, VERONIQUE K            Ot            
305.1                                  

 

 2015            JOHAN-KENDALL PA, VERONIQUE K            Ot            
401.9                                  

 

 2015            JOHAN-KENDALL PA, VERONIQUE K            Ot            
414.00                                  

 

 2015                         Ot            786.09                       
           

 

 2015                         Ot            786.50                       
           

 

 2015            PRADO-KENDALL PA, VERONIQUE K            Ot            
272.4                                  

 

 2015            PRADO-KENDALL PA, VERONIQUE K            Ot            
305.1                                  

 

 2015            JOHAN-KENDALL PA, VERONIQUE K            Ot            
401.9                                  

 

 2015            JOHAN-KENDALL PA, VERONIQUE K            Ot            
414.00                                  

 

 10/17/2016                         Ot            786.09            RESPIRATORY 
ABNORM NEC                     

 

 10/17/2016                         Ot            786.50            CHEST PAIN 
NOS                     

 

 10/17/2016            GRETCHEN PA, VERONIQUE K            Ot            
272.4            HYPERLIPIDEMIA NEC/NOS                     

 

 10/17/2016            GRETCHEN PA, VERONIQUE K            Ot            
305.1            TOBACCO USE DISORDER                     

 

 10/17/2016            GRETCHEN PA, VERONIQUE K            Ot            
401.9            HYPERTENSION NOS                     

 

 10/17/2016            GRETCHEN PA, VERONIQUE K            Ot            
414.00            CORON ATHEROSCLER NOS TYPE VESSEL, NATIV                     

 

 2017                         Ot            786.09            RESPIRATORY 
ABNORM NEC                     

 

 2017                         Ot            786.50            CHEST PAIN 
NOS                     

 

 2017            GRETCHEN PA, VERONIQUE K            Ot            
272.4            HYPERLIPIDEMIA NEC/NOS                     

 

 2017            VERONIQUE DARNELL            Ot            
305.1            TOBACCO USE DISORDER                     

 

 2017            VERONIQUE DARNELL            Ot            
401.9            HYPERTENSION NOS                     

 

 2017            VERONIQUE DARNELL            Ot            
414.00            CORON ATHEROSCLER NOS TYPE VESSEL, NATIV                     

 

 05/10/2017                         Ot            786.09            RESPIRATORY 
ABNORM NEC                     

 

 05/10/2017                         Ot            786.50            CHEST PAIN 
NOS                     

 

 05/10/2017            VERONIQUE DARNELL            Ot            
272.4            HYPERLIPIDEMIA NEC/NOS                     

 

 05/10/2017            VERONIQUE DARNELL            Ot            
305.1            TOBACCO USE DISORDER                     

 

 05/10/2017            VERONIQUE DARNELL            Ot            
401.9            HYPERTENSION NOS                     

 

 05/10/2017            VERONIQUE DARNELL            Ot            
414.00            CORON ATHEROSCLER NOS TYPE VESSEL, NATIV                     

 

 05/10/2017            JACY NATION MD            Ot            E78.2       
     MIXED HYPERLIPIDEMIA                     

 

 05/10/2017            JACY NATION MD            Ot            I10         
   ESSENTIAL (PRIMARY) HYPERTENSION                     

 

 05/10/2017            JACY NATION MD            Ot            I25.10      
      ATHSCL HEART DISEASE OF NATIVE CORONARY                      

 

 05/10/2017            JACY NATION MD            Ot            R07.89      
      OTHER CHEST PAIN                     

 

 05/10/2017            JACY NATION MD            Ot            Z72.0       
     TOBACCO USE                     

 

 2017            JACY NATION MD            Ot            E78.2       
     MIXED HYPERLIPIDEMIA                     

 

 2017            JACY NATION MD            Ot            I10         
   ESSENTIAL (PRIMARY) HYPERTENSION                     

 

 2017            JACY NATION MD            Ot            I25.10      
      ATHSCL HEART DISEASE OF NATIVE CORONARY                      

 

 2017            JACY NATION MD            Ot            R07.89      
      OTHER CHEST PAIN                     

 

 2017            JACY NATION MD            Ot            Z72.0       
     TOBACCO USE                     

 

 2017                         Ot            786.09            RESPIRATORY 
ABNORM NEC                     

 

 2017                         Ot            786.50            CHEST PAIN 
NOS                     

 

 2017            VERONIQUE DARNELL            Ot            
272.4            HYPERLIPIDEMIA NEC/NOS                     

 

 2017            VERONIQUE DARNELL            Ot            
305.1            TOBACCO USE DISORDER                     

 

 2017            VERONIQUE DARNELL            Ot            
401.9            HYPERTENSION NOS                     

 

 2017            VERONIQUE DARNELL            Ot            
414.00            CORON ATHEROSCLER NOS TYPE VESSEL, NATIV                     

 

 2017            JACY NATION MD            Ot            E78.2       
     MIXED HYPERLIPIDEMIA                     

 

 2017            JACY NATION MD            Ot            I10         
   ESSENTIAL (PRIMARY) HYPERTENSION                     

 

 2017            JACY NATION MD            Ot            I25.10      
      ATHSCL HEART DISEASE OF NATIVE CORONARY                      

 

 2017            JACY NATION MD            Ot            R07.89      
      OTHER CHEST PAIN                     

 

 2017            JACY NATION MD            Ot            Z72.0       
     TOBACCO USE                     

 

 2017                         Ot            786.09            RESPIRATORY 
ABNORM NEC                     

 

 2017                         Ot            786.50            CHEST PAIN 
NOS                     

 

 2017            VERONIQUE DARNELL            Ot            
272.4            HYPERLIPIDEMIA NEC/NOS                     

 

 2017            VERONIQUE DARNELL            Ot            
305.1            TOBACCO USE DISORDER                     

 

 2017            VERONIQUE DARNELL            Ot            
401.9            HYPERTENSION NOS                     

 

 2017            VERONIQUE DARNELL            Ot            
414.00            CORON ATHEROSCLER NOS TYPE VESSEL, NATIV                     

 

 2017            JACY NATION MD            Ot            E78.2       
     MIXED HYPERLIPIDEMIA                     

 

 2017            JACY NATION MD            Ot            I10         
   ESSENTIAL (PRIMARY) HYPERTENSION                     

 

 2017            JACY NATION MD            Ot            I25.10      
      ATHSCL HEART DISEASE OF NATIVE CORONARY                      

 

 2017            JACY NATION MD            Ot            R07.89      
      OTHER CHEST PAIN                     

 

 2017            JACY NATION MD            Ot            Z72.0       
     TOBACCO USE                     

 

 2017            HOANG CAMERON MD, Ot            E78.5        
    HYPERLIPIDEMIA, UNSPECIFIED                     

 

 2017            HOANG CAMERON MD            Ot            F17.210      
      NICOTINE DEPENDENCE, CIGARETTES, UNCOMPL                     

 

 2017            HOANG CAMERON MD            Ot            I10          
  ESSENTIAL (PRIMARY) HYPERTENSION                     

 

 2017            HOANG CAMERON MD            Ot            I25.10       
     ATHSCL HEART DISEASE OF NATIVE CORONARY                      

 

 2017            HOANG CAMERON MD            Ot            I25.2        
    OLD MYOCARDIAL INFARCTION                     

 

 2017            HOANG CAMERON MD            Ot            K21.9        
    GASTRO-ESOPHAGEAL REFLUX DISEASE WITHOUT                     

 

 2017            HOANG CAMERON MD            Ot            R07.9        
    CHEST PAIN, UNSPECIFIED                     

 

 2017            HOANG CAMERON MD            Ot            Z79.82       
     LONG TERM (CURRENT) USE OF ASPIRIN                     

 

 2017            HOANG CAMERON MD            Ot            Z91.19       
     PATIENT'S NONCOMPLIANCE W [facility]                     

 

 2017            HOANG CAMERON MD            Ot            Z95.5        
    PRESENCE OF CORONARY ANGIOPLASTY IMPLANT                     

 

 2017            HOANG CAMERON MD            Ot            E78.5        
    HYPERLIPIDEMIA, UNSPECIFIED                     

 

 2017            HOANG CAMERON MD            Ot            F17.210      
      NICOTINE DEPENDENCE, CIGARETTES, UNCOMPL                     

 

 2017            HOANG CAMERON MD            Ot            I10          
  ESSENTIAL (PRIMARY) HYPERTENSION                     

 

 2017            HOANG CAMERON MD, Ot            I25.10       
     ATHSCL HEART DISEASE OF NATIVE CORONARY                      

 

 2017            HOANG CAMERON MD, Ot            I25.2        
    OLD MYOCARDIAL INFARCTION                     

 

 2017            HOANG CAMERON MD, Ot            K21.9        
    GASTRO-ESOPHAGEAL REFLUX DISEASE WITHOUT                     

 

 2017            HOANG CAMERON MD            Ot            R07.9        
    CHEST PAIN, UNSPECIFIED                     

 

 2017            HOANG CAMERON MD, Ot            Z79.82       
     LONG TERM (CURRENT) USE OF ASPIRIN                     

 

 2017            HOANG CAMERON MD            Ot            Z91.19       
     PATIENT'S NONCOMPLIANCE W [facility] MED[facility]                     

 

 2017            HOANG CAMERON MD, Ot            Z95.5        
    PRESENCE OF CORONARY ANGIOPLASTY IMPLANT                     

 

 2018                         Ot            786.09            RESPIRATORY 
ABNORM NEC                     

 

 2018                         Ot            786.50            CHEST PAIN 
NOS                     

 

 2018            VERONIQUE DARNELL            Ot            
272.4            HYPERLIPIDEMIA NEC/NOS                     

 

 2018            VERONIQUE DARNELL            Ot            
305.1            TOBACCO USE DISORDER                     

 

 2018            VERONIQUE DARNELL            Ot            
401.9            HYPERTENSION NOS                     

 

 2018            VERONIQUE DARNELL            Ot            
414.00            CORON ATHEROSCLER NOS TYPE VESSEL, NATIV                     

 

 2018            JACY NATION MD            Ot            E78.2       
     MIXED HYPERLIPIDEMIA                     

 

 2018            JACY NATION MD, Ot            I10         
   ESSENTIAL (PRIMARY) HYPERTENSION                     

 

 2018            JACY NATION MD            Ot            I25.10      
      ATHSCL HEART DISEASE OF NATIVE CORONARY                      

 

 2018            JACY NATION MD            Ot            R07.89      
      OTHER CHEST PAIN                     

 

 2018            JACY NATION MD            Ot            Z72.0       
     TOBACCO USE                     

 

 01/10/2018            JACY NATION MD            Ot            E78.00      
      PURE HYPERCHOLESTEROLEMIA, UNSPECIFIED                     

 

 01/10/2018            JACY NATION MD            Ot            F17.210     
       NICOTINE DEPENDENCE, CIGARETTES, UNCOMPL                     

 

 01/10/2018            JACY NATION MD            Ot            I10         
   ESSENTIAL (PRIMARY) HYPERTENSION                     

 

 01/10/2018            JACY NATION MD            Ot            I21.19      
      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                     

 

 01/10/2018            JACY NATION MD            Ot            I25.10      
      ATHSCL HEART DISEASE OF [place] CORONARY                      

 

 01/10/2018            JACY NATION MD            Ot            K21.9       
     GASTRO-ESOPHAGEAL REFLUX DISEASE WITHOUT                     

 

 01/10/2018            JACY NATION MD            Ot            Z91.14      
      PATIENT'S OTHER NONCOMPLIANCE WITH MEDIC                     

 

 01/10/2018            JACY NATION MD            Ot            Z95.5       
     PRESENCE OF CORONARY ANGIOPLASTY IMPLANT                     

 

 01/10/2018                         Ot            786.09            RESPIRATORY 
ABNORM NEC                     

 

 01/10/2018                         Ot            786.50            CHEST PAIN 
NOS                     

 

 01/10/2018            VERONIQUE DARNELL            Ot            
272.4            HYPERLIPIDEMIA NEC/NOS                     

 

 01/10/2018            VERONIQUE DARNELL            Ot            
305.1            TOBACCO USE DISORDER                     

 

 01/10/2018            VERONIQUE DARNELL            Ot            
401.9            HYPERTENSION NOS                     

 

 01/10/2018            VERONIQUE DARNELL            Ot            
414.00            CORON ATHEROSCLER NOS TYPE VESSEL, NATIV                     

 

 01/10/2018            AJCY NATION MD            Ot            E78.2       
     MIXED HYPERLIPIDEMIA                     

 

 01/10/2018            JACY NATION MD            Ot            I10         
   ESSENTIAL (PRIMARY) HYPERTENSION                     

 

 01/10/2018            JACY NATION MD            Ot            I25.10      
      ATHSCL HEART DISEASE OF NATIVE CORONARY                      

 

 01/10/2018            JACY NATION MD            Ot            R07.89      
      OTHER CHEST PAIN                     

 

 01/10/2018            JACY NATION MD            Ot            Z72.0       
     TOBACCO USE                     

 

 2018                         Ot            786.09            RESPIRATORY 
ABNORM NEC                     

 

 2018                         Ot            786.50            CHEST PAIN 
NOS                     

 

 2018            VERONIQUE DARNELL            Ot            
272.4            HYPERLIPIDEMIA NEC/NOS                     

 

 2018            VERONIQUE DARNELL            Ot            
305.1            TOBACCO USE DISORDER                     

 

 2018            VERONIQUE DARNELL            Ot            
401.9            HYPERTENSION NOS                     

 

 2018            VERONIQUE DARNELL            Ot            
414.00            CORON ATHEROSCLER NOS TYPE VESSEL, NATIV                     

 

 2018            JACY NATION MD            Ot            E78.2       
     MIXED HYPERLIPIDEMIA                     

 

 2018            JACY NATION MD            Ot            I10         
   ESSENTIAL (PRIMARY) HYPERTENSION                     

 

 2018            JACY NATION MD            Ot            I25.10      
      ATHSCL HEART DISEASE OF NATIVE CORONARY                      

 

 2018            JACY NATION MD            Ot            R07.89      
      OTHER CHEST PAIN                     

 

 2018            JACY NATION MD            Ot            Z72.0       
     TOBACCO USE                     

 

 2018                         Ot            786.09            RESPIRATORY 
ABNORM NEC                     

 

 2018                         Ot            786.50            CHEST PAIN 
NOS                     

 

 2018            VERONIQUE DARNELL            Ot            
272.4            HYPERLIPIDEMIA NEC/NOS                     

 

 2018            VERONIQUE DARNELL            Ot            
305.1            TOBACCO USE DISORDER                     

 

 2018            VERONIQUE DARNELL            Ot            
401.9            HYPERTENSION NOS                     

 

 2018            VERONIQUE DARNELL            Ot            
414.00            CORON ATHEROSCLER NOS TYPE VESSEL, NATIV                     

 

 2018            JACY NATION MD            Ot            E78.2       
     MIXED HYPERLIPIDEMIA                     

 

 2018            JACY NATION MD            Ot            I10         
   ESSENTIAL (PRIMARY) HYPERTENSION                     

 

 2018            JACY NATION MD            Ot            I25.10      
      ATHSCL HEART DISEASE OF NAT[place] CORONARY                      

 

 2018            JACY NATION MD            Ot            R07.89      
      OTHER CHEST PAIN                     

 

 2018            JACY NATION MD            Ot            Z72.0       
     TOBACCO USE                     



                                                                               
                                                                               
                                                                               
                                                                               
                                                                               
                                                                               
                      



Procedures

      





 Code            Description            Performed By            Performed On   
     

 

             15679                                  ROUTINE VENIPUNCTURE       
                            2013        

 

             46605                                  CMP                        
           2013        

 

             14141                                  BNP                        
           2013        

 

             94597                                  TSH                        
           2013        

 

             01172                                  CBC                        
           2013        

 

             44805                                  CRP HS (CARDIO)            
                       2013        

 

             59669                                  EKG, TRACING (IN-HOUSE)    
                               2013        

 

             PIETER BACH                
                   2013        

 

             00.41                                  PROCEDURE ON TWO VESSELS   
                                2013        

 

             00.45                                  INSERTION OF ONE VASCULAR 
STENT                                   2013        

 

             00.66                                  PERCUTANEOUS TRANSLUMINAL 
CORONARY ANGIO                                   2013        

 

             36.07                                  INSRT OF DRUG-ELUTING CORON 
ARTERY STENT                                   2013        

 

             37.22                                  LEFT HEART CARDIAC CATH    
                               2013        

 

             88.53                                  LT HEART ANGIOCARDIOGRAM   
                                2013        

 

             88.56                                  CORONAR ARTERIOGR-2 CATH   
                                2013        

 

             Cardiolog                                  Emily Nationsaibne          
                         2013        

 

             76293                                  STRESS TEST, CARDIAC (
SPECIFY TYPE)                                   10/04/2013        

 

             71067                                  UA LONG DIP                
                   2014        

 

             448057H                                  DILATION OF 1 COR ART 
WITH DRUG-ELUT INT                                   2018        

 

             [id number]                                  MEASURE OF CARDIAC SAMPL 
  PRESSURE, L H                                   2018        

 

             [id number]                                  FLUOROSCOPY OF MULT COR 
ART USING L OSM                                    2018        

 

             [id number]                                  FLUOROSCOPY OF LEFT HEART 
USING LOW OSMO                                   2018        



                                                            



Results

      





 Test            Result            Range        









 Complete blood count (CBC) with automated white blood cell (WBC) differential 
- 17 09:46         









 Blood leukocytes automated count (number/volume)            9.0 10*3/uL       
     4.3-11.0        

 

 Blood erythrocytes automated count (number/volume)            4.99 10*6/uL    
        4.35-5.85        

 

 Venous blood hemoglobin measurement (mass/volume)            15.5 g/dL        
    13.3-17.7        

 

 Blood hematocrit (volume fraction)            44 %            40-54        

 

 Automated erythrocyte mean corpuscular volume            88 [foz_us]          
  80-99        

 

 Automated erythrocyte mean corpuscular hemoglobin (mass per erythrocyte)      
      31 pg            25-34        

 

 Automated erythrocyte mean corpuscular hemoglobin concentration measurement (
mass/volume)            35 g/dL            32-36        

 

 Automated erythrocyte distribution width ratio            13.4 %            
10.0-14.5        

 

 Automated blood platelet count (count/volume)            186 10*3/uL          
  130-400        

 

 Automated blood platelet mean volume measurement            9.9 [foz_us]      
      7.4-10.4        

 

 Automated blood neutrophils/100 leukocytes            64 %            42-75   
     

 

 Automated blood lymphocytes/100 leukocytes            27 %            12-44   
     

 

 Blood monocytes/100 leukocytes            8 %            0-12        

 

 Automated blood eosinophils/100 leukocytes            1 %            0-10     
   

 

 Automated blood basophils/100 leukocytes            0 %            0-10        

 

 Blood neutrophils automated count (number/volume)            5.8 10*3         
   1.8-7.8        

 

 Blood lymphocytes automated count (number/volume)            2.4 10*3         
   1.0-4.0        

 

 Blood monocytes automated count (number/volume)            0.7 10*3            
0.0-1.0        

 

 Automated eosinophil count            0.1 10*3/uL            0.0-0.3        

 

 Automated blood basophil count (count/volume)            0.0 10*3/uL          
  0.0-0.1        









 PT panel in platelet poor plasma by coagulation assay - 17 09:46         









 Prothrombin time (PT) in platelet poor plasma by coagulation assay            
12.3 s            12.2-14.7        

 

 INR in platelet poor plasma or blood by coagulation assay            0.9      
       0.8-1.4        









 Activated partial thromboplastin time (aPTT) in platelet poor plasma 
bycoagulation assay - 17 09:46         









 Activated partial thromboplastin time (aPTT) in platelet poor plasma 
bycoagulation assay            33 s            24-35        









 Comprehensive metabolic panel - 17 09:46         









 Serum or plasma sodium measurement (moles/volume)            138 mmol/L       
     135-145        

 

 Serum or plasma potassium measurement (moles/volume)            4.0 mmol/L    
        3.6-5.0        

 

 Serum or plasma chloride measurement (moles/volume)            104 mmol/L     
               

 

 Carbon dioxide            26 mmol/L            21-32        

 

 Serum or plasma anion gap determination (moles/volume)            8 mmol/L    
        5-14        

 

 Serum or plasma urea nitrogen measurement (mass/volume)            17 mg/dL   
         7-18        

 

 Serum or plasma creatinine measurement (mass/volume)            1.20 mg/dL    
        0.60-1.30        

 

 Serum or plasma urea nitrogen/creatinine mass ratio            14             
NRG        

 

 Serum or plasma creatinine measurement with calculation of estimated 
glomerular filtration rate            >             NRG        

 

 Serum or plasma glucose measurement (mass/volume)            161 mg/dL        
            

 

 Serum or plasma calcium measurement (mass/volume)            9.4 mg/dL        
    8.5-10.1        

 

 Serum or plasma total bilirubin measurement (mass/volume)            0.3 mg/dL
            0.1-1.0        

 

 Serum or plasma alkaline phosphatase measurement (enzymatic activity/volume)  
          76 U/L                    

 

 Serum or plasma aspartate aminotransferase measurement (enzymatic activity/
volume)            17 U/L            5-34        

 

 Serum or plasma alanine aminotransferase measurement (enzymatic activity/volume
)            16 U/L            0-55        

 

 Serum or plasma protein measurement (mass/volume)            7.1 g/dL         
   6.4-8.2        

 

 Serum or plasma albumin measurement (mass/volume)            4.2 g/dL         
   3.2-4.5        









 Magnesium - 17 09:46         









 Magnesium            1.8 mg/dL            1.8-2.4        









 Serum or plasma troponin i.cardiac measurement (mass/volume) - 17 09:46 
        









 Serum or plasma troponin i.cardiac measurement (mass/volume)            < ng/
mL            <0.30        









 Myoglobin, serum - 17 09:46         









 Myoglobin, serum            33.5 ng/mL            10.0-92.0        









 Lipase - 17 09:46         









 Lipase            27 U/L            8-78        









 Fibrin D-dimer FEU measurement in platelet poor plasma (mass/volume) -  09:46         









 Fibrin D-dimer FEU measurement in platelet poor plasma (mass/volume)          
  0.28 ug/mL            0.00-0.49        









 Complete blood count (CBC) with automated white blood cell (WBC) differential 
- 17 15:45         









 Blood leukocytes automated count (number/volume)            10.2 10*3/uL      
      4.3-11.0        

 

 Blood erythrocytes automated count (number/volume)            4.82 10*6/uL    
        4.35-5.85        

 

 Venous blood hemoglobin measurement (mass/volume)            14.7 g/dL        
    13.3-17.7        

 

 Blood hematocrit (volume fraction)            43 %            40-54        

 

 Automated erythrocyte mean corpuscular volume            89 [foz_us]          
  80-99        

 

 Automated erythrocyte mean corpuscular hemoglobin (mass per erythrocyte)      
      31 pg            25-34        

 

 Automated erythrocyte mean corpuscular hemoglobin concentration measurement (
mass/volume)            34 g/dL            32-36        

 

 Automated erythrocyte distribution width ratio            13.5 %            
10.0-14.5        

 

 Automated blood platelet count (count/volume)            173 10*3/uL          
  130-400        

 

 Automated blood platelet mean volume measurement            10.0 [foz_us]     
       7.4-10.4        

 

 Automated blood neutrophils/100 leukocytes            66 %            42-75   
     

 

 Automated blood lymphocytes/100 leukocytes            28 %            12-44   
     

 

 Blood monocytes/100 leukocytes            5 %            0-12        

 

 Automated blood eosinophils/100 leukocytes            1 %            0-10     
   

 

 Automated blood basophils/100 leukocytes            0 %            0-10        

 

 Blood neutrophils automated count (number/volume)            6.7 10*3         
   1.8-7.8        

 

 Blood lymphocytes automated count (number/volume)            2.9 10*3         
   1.0-4.0        

 

 Blood monocytes automated count (number/volume)            0.5 10*3            
0.0-1.0        

 

 Automated eosinophil count            0.1 10*3/uL            0.0-0.3        

 

 Automated blood basophil count (count/volume)            0.0 10*3/uL          
  0.0-0.1        









 Comprehensive metabolic panel - 17 15:45         









 Serum or plasma sodium measurement (moles/volume)            140 mmol/L       
     135-145        

 

 Serum or plasma potassium measurement (moles/volume)            4.6 mmol/L    
        3.6-5.0        

 

 Serum or plasma chloride measurement (moles/volume)            106 mmol/L     
               

 

 Carbon dioxide            27 mmol/L            21-32        

 

 Serum or plasma anion gap determination (moles/volume)            7 mmol/L    
        5-14        

 

 Serum or plasma urea nitrogen measurement (mass/volume)            14 mg/dL   
         7-18        

 

 Serum or plasma creatinine measurement (mass/volume)            0.95 mg/dL    
        0.60-1.30        

 

 Serum or plasma urea nitrogen/creatinine mass ratio            15             
NRG        

 

 Serum or plasma creatinine measurement with calculation of estimated 
glomerular filtration rate            >             NRG        

 

 Serum or plasma glucose measurement (mass/volume)            114 mg/dL        
            

 

 Serum or plasma calcium measurement (mass/volume)            9.0 mg/dL        
    8.5-10.1        

 

 Serum or plasma total bilirubin measurement (mass/volume)            0.6 mg/dL
            0.1-1.0        

 

 Serum or plasma alkaline phosphatase measurement (enzymatic activity/volume)  
          61 U/L                    

 

 Serum or plasma aspartate aminotransferase measurement (enzymatic activity/
volume)            16 U/L            5-34        

 

 Serum or plasma alanine aminotransferase measurement (enzymatic activity/volume
)            15 U/L            0-55        

 

 Serum or plasma protein measurement (mass/volume)            6.5 g/dL         
   6.4-8.2        

 

 Serum or plasma albumin measurement (mass/volume)            3.9 g/dL         
   3.2-4.5        









 Lipid 1996 panel - 17 15:45         









 Serum or plasma triglyceride measurement (mass/volume)            50 mg/dL    
        <150        

 

 Serum or plasma cholesterol measurement (mass/volume)            148 mg/dL    
        < 200        

 

 Serum or plasma cholesterol in HDL measurement (mass/volume)            34 mg/
dL            40-60        

 

 Cholesterol in LDL [mass/volume] in serum or plasma by direct assay            
110 mg/dL            1-129        

 

 Serum or plasma cholesterol in VLDL measurement (mass/volume)            10 mg/
dL            5-40        









 Serum or plasma creatine kinase measurement (enzymatic activity/volume) -  15:45         









 Serum or plasma creatine kinase measurement (enzymatic activity/volume)       
     127 U/L                    









 Serum or plasma troponin i.cardiac measurement (mass/volume) - 17 15:45 
        









 Serum or plasma troponin i.cardiac measurement (mass/volume)            < ng/
mL            <0.30        









 Myoglobin, serum - 17 15:45         









 Myoglobin, serum            38.1 ng/mL            10.0-92.0        









 Complete blood count (CBC) with automated white blood cell (WBC) differential 
- 18 12:22         









 Blood leukocytes automated count (number/volume)            12.3 10*3/uL      
      4.3-11.0        

 

 Blood erythrocytes automated count (number/volume)            5.11 10*6/uL    
        4.35-5.85        

 

 Venous blood hemoglobin measurement (mass/volume)            15.9 g/dL        
    13.3-17.7        

 

 Blood hematocrit (volume fraction)            45 %            40-54        

 

 Automated erythrocyte mean corpuscular volume            88 [foz_us]          
  80-99        

 

 Automated erythrocyte mean corpuscular hemoglobin (mass per erythrocyte)      
      31 pg            25-34        

 

 Automated erythrocyte mean corpuscular hemoglobin concentration measurement (
mass/volume)            35 g/dL            32-36        

 

 Automated erythrocyte distribution width ratio            13.3 %            
10.0-14.5        

 

 Automated blood platelet count (count/volume)            196 10*3/uL          
  130-400        

 

 Automated blood platelet mean volume measurement            9.7 [foz_us]      
      7.4-10.4        

 

 Automated blood neutrophils/100 leukocytes            73 %            42-75   
     

 

 Automated blood lymphocytes/100 leukocytes            20 %            12-44   
     

 

 Blood monocytes/100 leukocytes            7 %            0-12        

 

 Automated blood eosinophils/100 leukocytes            0 %            0-10     
   

 

 Automated blood basophils/100 leukocytes            0 %            0-10        

 

 Blood neutrophils automated count (number/volume)            9.0 10*3         
   1.8-7.8        

 

 Blood lymphocytes automated count (number/volume)            2.4 10*3         
   1.0-4.0        

 

 Blood monocytes automated count (number/volume)            0.8 10*3            
0.0-1.0        

 

 Automated eosinophil count            0.0 10*3/uL            0.0-0.3        

 

 Automated blood basophil count (count/volume)            0.0 10*3/uL          
  0.0-0.1        









 PT panel in platelet poor plasma by coagulation assay - 18 12:22         









 Prothrombin time (PT) in platelet poor plasma by coagulation assay            
12.8 s            12.2-14.7        

 

 INR in platelet poor plasma or blood by coagulation assay            1.0      
       0.8-1.4        









 Activated partial thromboplastin time (aPTT) in platelet poor plasma 
bycoagulation assay - 18 12:22         









 Activated partial thromboplastin time (aPTT) in platelet poor plasma 
bycoagulation assay            33 s            24-35        









 Comprehensive metabolic panel - 18 12:22         









 Serum or plasma sodium measurement (moles/volume)            136 mmol/L       
     135-145        

 

 Serum or plasma potassium measurement (moles/volume)            4.0 mmol/L    
        3.6-5.0        

 

 Serum or plasma chloride measurement (moles/volume)            101 mmol/L     
               

 

 Carbon dioxide            26 mmol/L            21-32        

 

 Serum or plasma anion gap determination (moles/volume)            9 mmol/L    
        5-14        

 

 Serum or plasma urea nitrogen measurement (mass/volume)            13 mg/dL   
         7-18        

 

 Serum or plasma creatinine measurement (mass/volume)            1.08 mg/dL    
        0.60-1.30        

 

 Serum or plasma urea nitrogen/creatinine mass ratio            12             
NRG        

 

 Serum or plasma creatinine measurement with calculation of estimated 
glomerular filtration rate            >             NRG        

 

 Serum or plasma glucose measurement (mass/volume)            143 mg/dL        
            

 

 Serum or plasma calcium measurement (mass/volume)            9.4 mg/dL        
    8.5-10.1        

 

 Serum or plasma total bilirubin measurement (mass/volume)            0.4 mg/dL
            0.1-1.0        

 

 Serum or plasma alkaline phosphatase measurement (enzymatic activity/volume)  
          65 U/L                    

 

 Serum or plasma aspartate aminotransferase measurement (enzymatic activity/
volume)            31 U/L            5-34        

 

 Serum or plasma alanine aminotransferase measurement (enzymatic activity/volume
)            20 U/L            0-55        

 

 Serum or plasma protein measurement (mass/volume)            6.8 g/dL         
   6.4-8.2        

 

 Serum or plasma albumin measurement (mass/volume)            4.3 g/dL         
   3.2-4.5        









 Magnesium - 18 12:22         









 Magnesium            1.7 mg/dL            1.8-2.4        









 Serum or plasma troponin i.cardiac measurement (mass/volume) - 18 12:22 
        









 Serum or plasma troponin i.cardiac measurement (mass/volume)            1.11 ng
/mL            <0.30        









 Myoglobin, serum - 18 12:22         









 Myoglobin, serum            483.6 ng/mL            10.0-92.0        









 Automated blood complete blood count (hemogram) panel - 01/10/18 05:00         









 Blood leukocytes automated count (number/volume)            9.9 10*3/uL       
     4.3-11.0        

 

 Blood erythrocytes automated count (number/volume)            4.78 10*6/uL    
        4.35-5.85        

 

 Venous blood hemoglobin measurement (mass/volume)            14.9 g/dL        
    13.3-17.7        

 

 Blood hematocrit (volume fraction)            43 %            40-54        

 

 Automated erythrocyte mean corpuscular volume            90 [foz_us]          
  80-99        

 

 Automated erythrocyte mean corpuscular hemoglobin (mass per erythrocyte)      
      31 pg            25-34        

 

 Automated erythrocyte mean corpuscular hemoglobin concentration measurement (
mass/volume)            35 g/dL            32-36        

 

 Automated erythrocyte distribution width ratio            13.4 %            
10.0-14.5        

 

 Automated blood platelet count (count/volume)            175 10*3/uL          
  130-400        

 

 Automated blood platelet mean volume measurement            10.1 [foz_us]     
       7.4-10.4        









 Whole blood basic metabolic panel - 01/10/18 05:00         









 Serum or plasma sodium measurement (moles/volume)            140 mmol/L       
     135-145        

 

 Serum or plasma potassium measurement (moles/volume)            4.4 mmol/L    
        3.6-5.0        

 

 Serum or plasma chloride measurement (moles/volume)            107 mmol/L     
               

 

 Carbon dioxide            22 mmol/L            21-32        

 

 Serum or plasma anion gap determination (moles/volume)            11 mmol/L   
         5-14        

 

 Serum or plasma urea nitrogen measurement (mass/volume)            11 mg/dL   
         7-18        

 

 Serum or plasma creatinine measurement (mass/volume)            0.94 mg/dL    
        0.60-1.30        

 

 Serum or plasma urea nitrogen/creatinine mass ratio            12             
NRG        

 

 Serum or plasma creatinine measurement with calculation of estimated 
glomerular filtration rate            >             NRG        

 

 Serum or plasma glucose measurement (mass/volume)            131 mg/dL        
            

 

 Serum or plasma calcium measurement (mass/volume)            9.2 mg/dL        
    8.5-10.1        









 Serum or plasma troponin i.cardiac measurement (mass/volume) - 01/10/18 05:00 
        









 Serum or plasma troponin i.cardiac measurement (mass/volume)            12.63 
ng/mL            <0.30        









 Lipid 1996 panel - 01/10/18 05:00         









 Serum or plasma triglyceride measurement (mass/volume)            65 mg/dL    
        <150        

 

 Serum or plasma cholesterol measurement (mass/volume)            124 mg/dL    
        < 200        

 

 Serum or plasma cholesterol in HDL measurement (mass/volume)            29 mg/
dL            40-60        

 

 Cholesterol in LDL [mass/volume] in serum or plasma by direct assay            
85 mg/dL            1-129        

 

 Serum or plasma cholesterol in VLDL measurement (mass/volume)            13 mg/
dL            5-40        



                                                                  



Encounters

      





 ACCT No.            Visit Date/Time            Discharge            Status    
        Pt. Type            Provider            Facility            Loc./Unit  
          Complaint        

 

 [id number]            2014 12:38:00            2014 23:59:59          
  CLS            Outpatient            MILAN AKBAR DO                        
                       

 

 [id number]            2014 14:32:00            2014 23:59:59          
  CLS            Outpatient            TEODORO FRANCO                   
                            

 

 [id number]            2014 15:52:00            2014 23:59:59          
  CLS            Outpatient            SHYANNE LEYVA MD                       
                        

 

 [id number]            2013 08:48:00            2013 23:59:59          
  CLS            Outpatient            MILAN AKBAR DO                        
                       

 

 [id number]            2013 14:02:00            2013 23:59:59          
  CLS            Outpatient            TERESITA MARISCAL               
                                

 

 [id number]            2013 15:26:00            2013 23:59:59          
  CLS            Outpatient            MILAN AKBAR DO                        
                       

 

 [id number]            2019 14:30:00            2019 23:59:59          
  [facility]                     

 

 [id number]            2018 11:00:00            2018 23:59:59    
        CLS            Preadmit            JACY NATION MD            [facility] 
[facility]            CARD            I25.10 CAD        

 

 [id number]            2018 12:27:00            01/10/2018 10:05:00    
        DIS            Inpatient            JACY NATION MD            [facility] 
[facility]            ICU            CHEST PAIN        

 

 [id number]            2017 10:55:00            2017 17:30:00    
        DIS            Inpatient            HOANG CAMERON MD            [facility] 
[facility]            4TH            CHEST PAIN        

 

 [id number]            2017 07:11:00            2017 23:59:59    
        CLS            Outpatient            JACY NATION MD            [facility] 
[facility]            CARD            CHEST PAIN,HTN,HLP,
TOBACCO USE        

 

 [id number]            2015 23:32:00            2015 10:20:00    
        DIS            Inpatient            STEFAN DOUGLAS FACC, LES CROFT [facility]S      
      [facility]            CSD            CHEST PAIN    
    

 

 [id number]            2015 08:15:00            2015 23:59:59    
        CLS            Outpatient            VERONIQUE DARNELL    
        [facility]            CARD            CAD,HLP    
    

 

 [id number]            2015 14:00:00            2015 23:59:59    
        CLS            Preadmit            JACY NATION MD            [facility] 
[facility]            CAD,HLP        

 

 [id number]            2013 11:53:00            2013 00:01:00    
        DIS            Outpatient            SWATI CONLEY MD            
[facility]            CARD            CHEST PAIN        

 

 [id number]            2013 16:30:00            2013 10:05:00    
        DIS            Inpatient            JACY NATION MD            [facility] 
[facility]            ICU            CP        

 

 [id number]            07/10/2013 12:46:00            2013 11:00:00    
        DIS            Inpatient            JACY NATION MD            [facility] 
[facility]            CSD            CHEST PAIN        

 

 [id number]            2013 12:32:00            2013 13:25:00    
        DIS            Outpatient            JACY NATION MD            [facility] 
[facility]            CATH            CP        

 

 [id number]            2019 07:45:00                         PEN       
     Preadmit            JACY NATION MD            [facility]            CARD            CAD, CHEST PAIN, HTN, HYPERLIPIDEMIA      
  

 

 [id number]            2019 12:00:00                         PEN       
     Preadmit            JACY NATION MD            [facility]            CAD, CHEST PAIN, HTN, HYPERLIPIDEMIA      
  

 

 [id number]            2013 12:15:00                                   
   Document Registration                                                       
     

 

 [id number]            2013 14:28:00                                   
   Document Registration

## 2019-02-16 NOTE — DIAGNOSTIC IMAGING REPORT
INDICATION: Chest pain. History of MI.



FINDINGS: Single view of the chest shows normal heart size and

vascularity. The lungs are clear. There is no effusion or

pneumothorax. There is no bony abnormality.



IMPRESSION: No acute abnormality is seen with no change from

12/05/2017.



Dictated by: 



  Dictated on workstation # [id number]

## 2019-02-16 NOTE — ED CHEST PAIN
General


Chief Complaint:  Chest Pain


Stated Complaint:  CHEST PAIN


Nursing Triage Note:  


CHEST PAIN STARTING 1.5 HR PTA.  X3 PREVIOUS MI.  X2 NITRO AND A BABY ASA TAKEN 


AT HOME.





EMS GAVE 324MG ASA IN ROUTE ALONG WITH FENTENYL 100MCG AND MORPHINE 4MG IV.


Nursing Sepsis Screen:  No Definite Risk


Source:  patient


Exam Limitations:  no limitations





History of Present Illness


Date Seen by Provider:  2019


Time Seen by Provider:  13:27


Initial Comments


This 53-year-old gentleman presents to the emergency room via EMS after 

developing intensifying chest pain this morning.  Pain started about an hour 

and a half prior to arrival.  Patient activated EMS when pain became severe.  

Patient received aspirin 324 mg and nitroglycerin 2 by EMS.  Nitroglycerin did 

not improve his pain.  Fentanyl 50 g 2 was administered.  Patient still had 

significant pain.  Patient then received morphine 4 mg by EMS.  Pain is now 

rated at about 2/10.  Patient has a history of coronary artery disease with a 

STEMI approximately one year ago with emergent angiography and stent placement.

  Patient reports compliance with aspirin but states Dr. Nation took him off 

Plavix.  EMS questioned ST elevation in route.  Immediate EKG performed on 

arrival demonstrated borderline ST changes.  Patient felt his pain was similar 

to prior cardiac events.  He therefore elected to take a Plavix 75 mg before 

arrival.





Allergies and Home Medications


Allergies


Uncoded Allergies:  


     bee sting (Adverse Reaction, Intermediate, swelling, 13)





Home Medications


Aspirin 81 Mg Tablet.dr, 81 MG PO DAILY, (Reported)


Omega 3 Polyunsat Fatty Acids 1,000 Mg Cap, 2,000 MG PO DAILY, (Reported)





Patient Home Medication List


Home Medication List Reviewed:  Yes





Review of Systems


Review of Systems


Constitutional:  no symptoms reported


EENTM:  No Symptoms Reported


Respiratory:  No Symptoms Reported


Cardiovascular:  See HPI


Gastrointestinal:  No Symptoms Reported


Genitourinary:  No Symptoms Reported


Musculoskeletal:  no symptoms reported


Skin:  no symptoms reported


Psychiatric/Neurological:  No Symptoms Reported


Endocrine:  No Symptoms Reported


Hematologic/Lymphatic:  No Symptoms Reported





Past Medical-Social-Family Hx


Past Med/Social Hx:  Reviewed Nursing Past Med/Soc Hx


Patient Social History


Alcohol Use:  Denies Use


Recreational Drug Use:  No


Smoking Status:  Current Everyday Smoker


Type Used:  Cigarettes


2nd Hand Smoke Exposure:  No


Recent Foreign Travel:  No


Contact w/Someone Who Travel:  No


Recent Infectious Disease Expo:  No


Recent Hopitalizations:  Yes





Immunizations Up To Date


Tetanus Booster (TDap):  Less than 5yrs





Seasonal Allergies


Seasonal Allergies:  No





Past Medical History


Surgeries:  Yes (right KNEE, FINGER, RIGHT HAND SURGERY)


Cardiac, Coronary Stent, Orthopedic


Respiratory:  No


Cardiac:  Yes (STENT X 1)


Coronary Artery Disease, Heart Attack, High Cholesterol


Neurological:  No


Reproductive Disorders:  No


Gastrointestinal:  No


Gastroesophageal Reflux


Musculoskeletal:  No


Endocrine:  No


HEENT:  No


Cancer:  No


Psychosocial:  No


Integumentary:  No


Blood Disorders:  No


Adverse Reaction/Blood Tranf:  No





Family Medical History


No Pertinent Family Hx





Physical Exam


Vital Signs





Vital Signs - First Documented








 19





 13:34 13:45


 


Temp 98.0 


 


Pulse 77 


 


Resp 16 


 


B/P (MAP) 116/79 (91) 


 


Pulse Ox 100 


 


O2 Delivery Room Air 


 


O2 Flow Rate  2.00





Capillary Refill : Less Than 3 Seconds


Height, Weight, BMI


Height: 5'8.00"


Weight: 147lbs. 2.0oz. 66.248645kr; 22.1 BMI


Method:Stated


General Appearance:  No Apparent Distress, WD/WN, Thin


HEENT:  PERRL/EOMI, Normal ENT Inspection


Neck:  Normal Inspection


Respiratory:  Lungs Clear, Normal Breath Sounds, No Accessory Muscle Use, No 

Respiratory Distress


Cardiovascular:  Regular Rate, Rhythm, No Edema, No Murmur, Normal Peripheral 

Pulses


Gastrointestinal:  Non Tender, Soft


Extremity:  Normal Capillary Refill, Normal Inspection, Non Tender, No Pedal 

Edema


Neurologic/Psychiatric:  Alert, Oriented x3, No Motor/Sensory Deficits, Normal 

Mood/Affect, CNs II-XII Norm as Tested


Skin:  Normal Color, Warm/Dry





Progress/Results/Core Measures


Results/Orders


Lab Results





Laboratory Tests








Test


 19


13:37 Range/Units


 


 


White Blood Count


 12.9 H


 4.3-11.0


10^3/uL


 


Red Blood Count


 4.79 


 4.35-5.85


10^6/uL


 


Hemoglobin 14.9  13.3-17.7  G/DL


 


Hematocrit 42  40-54  %


 


Mean Corpuscular Volume 89  80-99  FL


 


Mean Corpuscular Hemoglobin 31  25-34  PG


 


Mean Corpuscular Hemoglobin


Concent 35 


 32-36  G/DL





 


Red Cell Distribution Width 13.7  10.0-14.5  %


 


Platelet Count


 264 


 130-400


10^3/uL


 


Mean Platelet Volume 9.7  7.4-10.4  FL


 


Neutrophils (%) (Auto) 75  42-75  %


 


Lymphocytes (%) (Auto) 20  12-44  %


 


Monocytes (%) (Auto) 5  0-12  %


 


Eosinophils (%) (Auto) 0  0-10  %


 


Basophils (%) (Auto) 0  0-10  %


 


Neutrophils # (Auto) 9.6 H 1.8-7.8  X 10^3


 


Lymphocytes # (Auto) 2.6  1.0-4.0  X 10^3


 


Monocytes # (Auto) 0.6  0.0-1.0  X 10^3


 


Eosinophils # (Auto)


 0.1 


 0.0-0.3


10^3/uL


 


Basophils # (Auto)


 0.0 


 0.0-0.1


10^3/uL


 


Prothrombin Time 12.8  12.2-14.7  SEC


 


INR Comment 1.0  0.8-1.4  


 


Activated Partial


Thromboplast Time 35 


 24-35  SEC





 


Sodium Level 135  135-145  MMOL/L


 


Potassium Level 4.3  3.6-5.0  MMOL/L


 


Chloride Level 101    MMOL/L


 


Carbon Dioxide Level 23  21-32  MMOL/L


 


Anion Gap 11  5-14  MMOL/L


 


Blood Urea Nitrogen 10  7-18  MG/DL


 


Creatinine


 1.04 


 0.60-1.30


MG/DL


 


Estimat Glomerular Filtration


Rate > 60 


  





 


BUN/Creatinine Ratio 10   


 


Glucose Level 149 H   MG/DL


 


Calcium Level 9.1  8.5-10.1  MG/DL


 


Corrected Calcium 8.9  8.5-10.1  MG/DL


 


Magnesium Level 2.0  1.8-2.4  MG/DL


 


Total Bilirubin 0.2  0.1-1.0  MG/DL


 


Aspartate Amino Transf


(AST/SGOT) 23 


 5-34  U/L





 


Alanine Aminotransferase


(ALT/SGPT) 23 


 0-55  U/L





 


Alkaline Phosphatase 74    U/L


 


Myoglobin


 36.3 


 10.0-92.0


NG/ML


 


Troponin I 0.150  <0.028  NG/ML


 


Total Protein 6.9  6.4-8.2  GM/DL


 


Albumin 4.2  3.2-4.5  GM/DL








My Orders





Orders - ANTIONETTE MULTANI MD


Cbc With Automated Diff (19 13:39)


Magnesium (19 13:39)


Chest 1 View, Ap/Pa Only (19 13:39)


Ekg Tracing (19 13:39)


Cardiac Profile 1 (19 13:39)


Comprehensive Metabolic Panel (19 13:39)


Myoglobin Serum (19 13:39)


Protime With Inr (19 13:39)


Partial Thromboplastin Time (19 13:39)


O2 (19 13:39)


Monitor-Rhythm Ecg Trace Only (19 13:39)


Saline Lock/Iv-Start (19 13:39)


Clopidogrel Tablet (Plavix Tablet) (19 14:00)


Metoprolol Succinate (Xl) Tab (Toprol Xl (19 14:00)


Ekg Tracing (19 13:55)





Vital Signs/I&O











 19





 13:34 13:45


 


Temp 98.0 


 


Pulse 77 


 


Resp 16 


 


B/P (MAP) 116/79 (91) 


 


Pulse Ox 100 


 


O2 Delivery Room Air Nasal Cannula


 


O2 Flow Rate  2.00














Blood Pressure Mean:  91











Progress


Progress Note :  


Progress Note


Patient had already received aspirin upon arrival.  Pain was very mild at my 

assessment.  Initial EKG at 13:29 demonstrated a borderline ST changes.  Dr. Fuller was immediately consulted and EKG reviewed.  These changes were felt to 

be nondiagnostic.  EKG was repeated at 13:47.  This repeat EKG showed no 

significant changes from prior admission.  Per Dr. Fuller's instructions, 

patient received Plavix 300 mg, Toprol-XL 50 mg, and aspirin as already given 

by EMS.  Patient was admitted to the cardiac stepdown unit on the chest pain 

protocol.  Pain had dissipated to 1/10 at the time of admission.


EKG #1:  


   EKG Time:  13:29


   Rate:  79


   Rhythm:  Normal Sinus


Comment


Normal sinus rhythm.  Subtle ST changes nondiagnostic for STEMI.  No ST 

depression.  No abnormal intervals or axis deviation.


EKG #2:  


   EKG Time:  13:47


   Rate:  74


   Rhythm:  Normal Sinus


Comment


Normal sinus rhythm with no ST elevation or depression.  No abnormal intervals 

or axis deviation.  There are no adverse dynamic changes from prior.





Diagnostic Imaging





   Diagonstic Imaging:  Xray


   Plain Films/CT/US/NM/MRI:  chest


Comments


NAME:      MICHAEL BUTLER JR


[facility] REC#:   [id number]


ACCOUNT#:   [id number]


PT STATUS:   REG ER


:      1965


PHYSICIAN:    ANTIONETTE MULTANI MD


ADMIT DATE:   19/ER


***Signed***


Date of Exam:   19





CHEST 1 VIEW, AP/PA ONLY


 





INDICATION: Chest pain. History of MI.





FINDINGS: Single view of the chest shows normal heart size and


vascularity. The lungs are clear. There is no effusion or


pneumothorax. There is no bony abnormality.





IMPRESSION: No acute abnormality is seen with no change from


2017.





Dictated by: 





  Dictated on workstation # [id number]





[id number]





Dict:      19 1428


Trans:      19 1458





Interpreted by:         OLIVIA FLORENTINO MD


Electronically signed by:   OLIVIA FLORENTINO MD 19 1458


   Reviewed:  Reviewed by Me





Departure


Communication (Admissions)


Dr. Wilson


Time/Spoke to Consulting Phy:  13:45


Dr. Fuller





Impression





 Primary Impression:  


 Chest pain


 Qualified Codes:  R07.9 - Chest pain, unspecified


 Additional Impression:  


 Coronary artery disease


 Qualified Codes:  I25.10 - Atherosclerotic heart disease of native coronary 

artery without angina pectoris


Disposition:   ADMITTED AS INPATIENT


Condition:  Improved





Departure-Patient Inst.


Referrals:  


NO,LOCAL PHYSICIAN (PCP/Family)


Primary Care Physician











ANTIONETTE MULTANI MD 2019 13:55

## 2019-02-16 NOTE — NUR
PT C/O MEDIAL CRUSHING CP 1/10 W/O RADIATION, NO N/V/DIZZINESS. EKG OBTAINED, DR AVILES IN 
ROOM TO SEE PT AND REVIEWED EKG.

## 2019-02-16 NOTE — XMS REPORT
Continuity of Care Document

 Created on: 2019



MICHAEL BUTLER

External Reference #: [id number]

: 1965

Sex: Male



Demographics







 Address  [facility] [address]  [address]

 

 Home Phone  [phone] x

 

 Preferred Language  Unknown

 

 Marital Status  Unknown

 

 Hindu Affiliation  Unknown

 

 Race  Unknown

 

 Ethnic Group  Unknown





Author







 Author  Pending sale to [facility]

 

 Address  Unknown

 

 Phone  Unavailable



              



Allergies

      





 Active            Description            Code            Type            
Severity            Reaction            Onset            Reported/Identified   
         Relationship to Patient            Clinical Status        

 

 Yes            bee sting            bee sting                         Moderate
            swelling                         2013                        
          



                  



Medications

      



There is no data.                  



Problems

      





 Date Dx Coded            Attending            Type            Code            
Diagnosis            Diagnosed By        

 

 2012            MILAN AKBAR DO                         611.1          
  HYPERTROPHY OF BREAST                     

 

 2012            TERESITA MARISCAL                         611.1 
           HYPERTROPHY OF BREAST                     

 

 2012            MILAN AKBAR DO                         611.1          
  HYPERTROPHY OF BREAST                     

 

 2012            SHYANNE LEYVA MD                         611.1         
   HYPERTROPHY OF BREAST                     

 

 2012            TEODORO FRANCO                         611.1     
       HYPERTROPHY OF BREAST                     

 

 2012            MILAN AKBAR DO                         611.1          
  HYPERTROPHY OF BREAST                     

 

 2012            MILAN AKBAR DO                         786.50         
   CHEST PAIN                     

 

 2012            TERESITA MARISCAL                         786.50
            CHEST PAIN                     

 

 2012            MILAN AKBAR DO                         786.50         
   CHEST PAIN                     

 

 2012            SHYANNE LEYVA MD                         786.50        
    CHEST PAIN                     

 

 2012            TEODORO FRANCO                         786.50    
        CHEST PAIN                     

 

 2012            MILAN AKBAR DO                         786.50         
   CHEST PAIN                     

 

 09/15/2012            MILAN AKBAR DO                         522.5          
  PERIAPICAL ABSCESS WITHOUT SINUS                     

 

 09/15/2012            TERESITA MARISCAL                         522.5 
           PERIAPICAL ABSCESS WITHOUT SINUS                     

 

 09/15/2012            MILAN AKBAR DO                         522.5          
  PERIAPICAL ABSCESS WITHOUT SINUS                     

 

 09/15/2012            SHYANNE LEYVA MD                         522.5         
   PERIAPICAL ABSCESS WITHOUT SINUS                     

 

 09/15/2012            TEODORO FRANCO                         522.5     
       PERIAPICAL ABSCESS WITHOUT SINUS                     

 

 09/15/2012            MILAN AKBAR DO                         522.5          
  PERIAPICAL ABSCESS WITHOUT SINUS                     

 

 2013            MILAN AKBAR DO                         780.4          
  DIZZINESS AND VERTIGO                     

 

 2013            BRITTON APRN, TERESITA R                         780.4 
           DIZZINESS AND VERTIGO                     

 

 2013            MILAN AKBAR DO K                         780.4          
  DIZZINESS AND VERTIGO                     

 

 2013            SHYANNE LEYVA MD                         780.4         
   DIZZINESS AND VERTIGO                     

 

 2013            TEODORO FRANCO R                         780.4     
       DIZZINESS AND VERTIGO                     

 

 2013            MILAN AKBAR DO K                         780.4          
  DIZZINESS AND VERTIGO                     

 

 2013                         Ot            305.1            TOBACCO USE 
DISORDER                     

 

 2013                         Ot            414.01            CORONARY 
ATHEROSCLEROSIS OF NATIVE CORON                     

 

 2013            TERESITA MARISCAL R                         989.5 
           TOXIC REACTION TO VENOM OF SPIDER                     

 

 2013            MILAN AKBAR DO K                         989.5          
  TOXIC REACTION TO VENOM OF SPIDER                     

 

 2013            SHYANNE LEYVA MD                         989.5         
   TOXIC REACTION TO VENOM OF SPIDER                     

 

 2013            TEODORO FRANCO R                         989.5     
       TOXIC REACTION TO VENOM OF SPIDER                     

 

 2013            MILAN AKBAR DO K                         989.5          
  TOXIC REACTION TO VENOM OF SPIDER                     

 

 2013            JACY NATION MD            Ot            305.1       
     TOBACCO USE DISORDER                     

 

 2013            JACY NATION MD            Ot            411.1       
     INTERMED CORONARY SYND                     

 

 2013            JACY NATION MD            Ot            414.01      
      CORONARY ATHEROSCLEROSIS OF NATIVE CORON                     

 

 2013            JACY NATION MD            Ot            V15.81      
      HX OF PAST NONCOMPLIANCE                     

 

 2013            JACY NATION MD            Ot            V45.82      
      PERCUTANEOUS TRANSLUM CORON ANGIOPLASTY                      

 

 2013            JACY NATION MD            Ot            272.4       
     HYPERLIPIDEMIA NEC/NOS                     

 

 2013            JACY NATION MD            Ot            305.1       
     TOBACCO USE DISORDER                     

 

 2013            JACY NATION MD            Ot            401.9       
     HYPERTENSION NOS                     

 

 2013            JACY NATION MD            Ot            410.72      
      AC MYOCARD INFARCT,SUBENDO INFARCT,SUBSE                     

 

 2013            JACY NATION MD            Ot            414.01      
      CORONARY ATHEROSCLEROSIS OF NATIVE CORON                     

 

 2013            JACY NATION MD            Ot            425.4       
     PRIM CARDIOMYOPATHY NEC                     

 

 2013            JACY NATION MD            Ot            428.0       
     CONGESTIVE HEART FAILURE NOS                     

 

 2013            JACY NATION MD            Ot            428.22      
      CHRONIC SYSTOLIC HRT FAILURE                     

 

 2013            JACY NATION MD            Ot            458.9       
     HYPOTENSION NOS                     

 

 2013            JACY NATION MD            Ot            786.59      
      CHEST PAIN NEC                     

 

 2013            JACY NATION MD            Ot            V15.81      
      HX OF PAST NONCOMPLIANCE                     

 

 2013            JACY NATION MD            Ot            V45.82      
      PERCUTANEOUS TRANSLUM CORON ANGIOPLASTY                      

 

 2013            JACY NATION MD            Ot            V58.63      
      LONG-TERM(CURRENT)USE OF ANTIPLATELET/AN                     

 

 2013            JACY NATION MD            Ot            V58.66      
      LONG-TERM (CURRENT) USE OF ASPIRIN                     

 

 2013            JACY NATION MD            Ot            272.4       
     HYPERLIPIDEMIA NEC/NOS                     

 

 2013            JACY NATION MD            Ot            275.41      
      HYPOCALCEMIA                     

 

 2013            JACY NATION MD            Ot            305.1       
     TOBACCO USE DISORDER                     

 

 2013            JACY NATION MD            Ot            413.9       
     ANGINA PECTORIS NEC/NOS                     

 

 2013            JACY NATION MD            Ot            414.01      
      CORONARY ATHEROSCLEROSIS OF NATIVE CORON                     

 

 2013            JACY NATION MD            Ot            780.2       
     SYNCOPE AND COLLAPSE                     

 

 2013            JACY NATION MD            Ot            780.4       
     DIZZINESS AND GIDDINESS                     

 

 2013            JACY NATION MD            Ot            V17.3       
     FAM HX-ISCHEM HEART DIS                     

 

 2013            JACY NATION MD, Ot            V45.82      
      PERCUTANEOUS TRANSLUM CORON ANGIOPLASTY                      

 

 2013            MILAN AKBAR DO                         414.00         
   CAD                     

 

 2013            MILAN AKBAR DO K                         786.59         
   OTHER CHEST PAIN                     

 

 2013            SHYANNE LEYVA MD                         414.00        
    CAD                     

 

 2013            SHYANNE LEYVA MD                         786.59        
    OTHER CHEST PAIN                     

 

 2013            TEODORO FRANCO                         414.00    
        CAD                     

 

 2013            TEODORO FRANCO                         786.59    
        OTHER CHEST PAIN                     

 

 2013            MILAN AKBAR DO K                         414.00         
   CAD                     

 

 2013            MILAN AKBAR DO K                         786.59         
   OTHER CHEST PAIN                     

 

 2013            SWATI CONLEY MD            Ot            786.09  
          RESPIRATORY ABNORM NEC                     

 

 2013            SWATI CONLEY MD            Ot            786.50  
          CHEST PAIN NOS                     

 

 2014            TEODORO FRANCO                         V70.5     
       EXAM - PRE-EMPLOYMENT                     

 

 2014            MILAN AKBAR DO                         V70.5          
  EXAM - PRE-EMPLOYMENT                     

 

 2014            MILAN AKBAR DO                         461.9          
  SINUSITIS ACUTE                     

 

 2014            MILAN AKBAR DO                         466.0          
  BRONCHITIS, ACUTE                     

 

 2014            MILAN AKBAR DO                         V65.42         
   COUNSELING - SMOKING CESSATION                     

 

 2014                         Ot            786.09                       
           

 

 2014                         Ot            786.50                       
           

 

 2015                         Ot            786.09                       
           

 

 2015                         Ot            786.50                       
           

 

 2015            STEFAN MORALESC, LES FACP CCDS            Ot            
272.4            HYPERLIPIDEMIA NEC/NOS                     

 

 2015            STEFAN DOUGLAS FACC, ALI FACP CCDS            Ot            
305.1            TOBACCO USE DISORDER                     

 

 2015            STEFAN DOUGLAS FACC, ALI FACP CCDS            Ot            
401.9            HYPERTENSION NOS                     

 

 2015            STEFAN DOUGLAS FACC, ALI FACP CCDS            Ot            
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                     

 

 2015            STEFAN DOUGLAS FACC, ALI FACP CCDS            Ot            
414.01            CORONARY ATHEROSCLEROSIS OF NATIVE CORON                     

 

 2015            STEFAN DOUGLAS FACC, ALI FACP CCDS            Ot            
786.50            CHEST PAIN NOS                     

 

 2015            STEFAN DOUGLAS FACC, LES FACP CCDS            Ot            
V15.81            HX OF PAST NONCOMPLIANCE                     

 

 2015            STEFAN DOUGLAS FACC, ALI FACP CCDS            Ot            
V45.82            PERCUTANEOUS TRANSLUM CORON ANGIOPLASTY                      

 

 2015                         Ot            786.09                       
           

 

 2015                         Ot            786.50                       
           

 

 2015            VERONIQUE DARNELL            Ot            
272.4                                  

 

 2015            VERONIQUE DARNELL            Ot            
305.1                                  

 

 2015            VERONIQUE DARNELL            Ot            
401.9                                  

 

 2015            VERONIQUE DARNELL            Ot            
414.00                                  

 

 2015                         Ot            786.09                       
           

 

 2015                         Ot            786.50                       
           

 

 2015            VERONIQUE DARNELL            Ot            
272.4                                  

 

 2015            VERONIQUE DARNELL            Ot            
305.1                                  

 

 2015            VERONIQUE DARNELL            Ot            
401.9                                  

 

 2015            VERONIQUE DARNELL            Ot            
414.00                                  

 

 2015                         Ot            786.09                       
           

 

 2015                         Ot            786.50                       
           

 

 2015            JOHAN-KENDALL PA, VERONIQUE K            Ot            
272.4                                  

 

 2015            PRADO-KENDALL PA, VERONIQUE K            Ot            
305.1                                  

 

 2015            PRADO-KENDALL PA, VERONIQUE K            Ot            
401.9                                  

 

 2015            PRADO-KENDALL PA, VERONIQUE K            Ot            
414.00                                  

 

 2015            PRADO-KENDALL PA, VERONIQUE K            Ot            
272.4                                  

 

 2015            PRADO-KENDALL PA, VERONIQUE K            Ot            
305.1                                  

 

 2015            PRADO-KENDALL PA, VERONIQUE K            Ot            
401.9                                  

 

 2015            PRADO-KENDALL PA, VERONIQUE K            Ot            
414.00                                  

 

 2015            PRADO-KENDALL PA, VERONIQUE K            Ot            
272.4                                  

 

 2015            PRADO-KENDALL PA, VERONIQUE K            Ot            
305.1                                  

 

 2015            JOHAN-KENDALL PA, VERONIQUE K            Ot            
401.9                                  

 

 2015            JOHAN-KENDALL PA, VERONIQUE K            Ot            
414.00                                  

 

 2015                         Ot            786.09                       
           

 

 2015                         Ot            786.50                       
           

 

 2015            PRADO-KENDALL PA, VERONIQUE K            Ot            
272.4                                  

 

 2015            PRADO-KENDALL PA, VERONIQUE K            Ot            
305.1                                  

 

 2015            JOHAN-KENDALL PA, VERONIQUE K            Ot            
401.9                                  

 

 2015            JOHAN-KENDALL PA, VERONIQUE K            Ot            
414.00                                  

 

 10/17/2016                         Ot            786.09            RESPIRATORY 
ABNORM NEC                     

 

 10/17/2016                         Ot            786.50            CHEST PAIN 
NOS                     

 

 10/17/2016            GRETCHEN PA, VERONIQUE K            Ot            
272.4            HYPERLIPIDEMIA NEC/NOS                     

 

 10/17/2016            GRETCHEN PA, VERONIQUE K            Ot            
305.1            TOBACCO USE DISORDER                     

 

 10/17/2016            GRETCHEN PA, VERONIQUE K            Ot            
401.9            HYPERTENSION NOS                     

 

 10/17/2016            GRETCHEN PA, VERONIQUE K            Ot            
414.00            CORON ATHEROSCLER NOS TYPE VESSEL, NATIV                     

 

 2017                         Ot            786.09            RESPIRATORY 
ABNORM NEC                     

 

 2017                         Ot            786.50            CHEST PAIN 
NOS                     

 

 2017            GRETCHEN PA, VERONIQUE K            Ot            
272.4            HYPERLIPIDEMIA NEC/NOS                     

 

 2017            VERONIQUE DARNELL            Ot            
305.1            TOBACCO USE DISORDER                     

 

 2017            VERONIQUE DARNELL            Ot            
401.9            HYPERTENSION NOS                     

 

 2017            VERONIQUE DARNELL            Ot            
414.00            CORON ATHEROSCLER NOS TYPE VESSEL, NATIV                     

 

 05/10/2017                         Ot            786.09            RESPIRATORY 
ABNORM NEC                     

 

 05/10/2017                         Ot            786.50            CHEST PAIN 
NOS                     

 

 05/10/2017            VERONIQUE DARNELL            Ot            
272.4            HYPERLIPIDEMIA NEC/NOS                     

 

 05/10/2017            VERONIQUE DARNELL            Ot            
305.1            TOBACCO USE DISORDER                     

 

 05/10/2017            VERONIQUE DARNELL            Ot            
401.9            HYPERTENSION NOS                     

 

 05/10/2017            VERONIQUE DARNELL            Ot            
414.00            CORON ATHEROSCLER NOS TYPE VESSEL, NATIV                     

 

 05/10/2017            JACY NATION MD            Ot            E78.2       
     MIXED HYPERLIPIDEMIA                     

 

 05/10/2017            JACY NATION MD            Ot            I10         
   ESSENTIAL (PRIMARY) HYPERTENSION                     

 

 05/10/2017            JACY NATION MD            Ot            I25.10      
      ATHSCL HEART DISEASE OF NATIVE CORONARY                      

 

 05/10/2017            JACY NATION MD            Ot            R07.89      
      OTHER CHEST PAIN                     

 

 05/10/2017            JACY NATION MD            Ot            Z72.0       
     TOBACCO USE                     

 

 2017            JACY NATION MD            Ot            E78.2       
     MIXED HYPERLIPIDEMIA                     

 

 2017            JACY NATION MD            Ot            I10         
   ESSENTIAL (PRIMARY) HYPERTENSION                     

 

 2017            JACY NATION MD            Ot            I25.10      
      ATHSCL HEART DISEASE OF NATIVE CORONARY                      

 

 2017            JACY NATION MD            Ot            R07.89      
      OTHER CHEST PAIN                     

 

 2017            JACY NATION MD            Ot            Z72.0       
     TOBACCO USE                     

 

 2017                         Ot            786.09            RESPIRATORY 
ABNORM NEC                     

 

 2017                         Ot            786.50            CHEST PAIN 
NOS                     

 

 2017            VERONIQUE DARNELL            Ot            
272.4            HYPERLIPIDEMIA NEC/NOS                     

 

 2017            VERONIQUE DARNELL            Ot            
305.1            TOBACCO USE DISORDER                     

 

 2017            VERONIQUE DARNELL            Ot            
401.9            HYPERTENSION NOS                     

 

 2017            VERONIQUE DARNELL            Ot            
414.00            CORON ATHEROSCLER NOS TYPE VESSEL, NATIV                     

 

 2017            JACY NATION MD            Ot            E78.2       
     MIXED HYPERLIPIDEMIA                     

 

 2017            JACY NATION MD            Ot            I10         
   ESSENTIAL (PRIMARY) HYPERTENSION                     

 

 2017            JACY NATION MD            Ot            I25.10      
      ATHSCL HEART DISEASE OF NATIVE CORONARY                      

 

 2017            JACY NATION MD            Ot            R07.89      
      OTHER CHEST PAIN                     

 

 2017            JACY NATION MD            Ot            Z72.0       
     TOBACCO USE                     

 

 2017                         Ot            786.09            RESPIRATORY 
ABNORM NEC                     

 

 2017                         Ot            786.50            CHEST PAIN 
NOS                     

 

 2017            VERONIQUE DARNELL            Ot            
272.4            HYPERLIPIDEMIA NEC/NOS                     

 

 2017            VERONIQUE DARNELL            Ot            
305.1            TOBACCO USE DISORDER                     

 

 2017            VERONIQUE DARNELL            Ot            
401.9            HYPERTENSION NOS                     

 

 2017            VERONIQUE DARNELL            Ot            
414.00            CORON ATHEROSCLER NOS TYPE VESSEL, NATIV                     

 

 2017            JACY NATION MD            Ot            E78.2       
     MIXED HYPERLIPIDEMIA                     

 

 2017            JACY NATION MD            Ot            I10         
   ESSENTIAL (PRIMARY) HYPERTENSION                     

 

 2017            JACY NATION MD            Ot            I25.10      
      ATHSCL HEART DISEASE OF NATIVE CORONARY                      

 

 2017            JACY NATION MD            Ot            R07.89      
      OTHER CHEST PAIN                     

 

 2017            JACY NATION MD            Ot            Z72.0       
     TOBACCO USE                     

 

 2017            HOANG CAMERON MD, Ot            E78.5        
    HYPERLIPIDEMIA, UNSPECIFIED                     

 

 2017            HOANG CAMERON MD            Ot            F17.210      
      NICOTINE DEPENDENCE, CIGARETTES, UNCOMPL                     

 

 2017            HOANG CAMERON MD            Ot            I10          
  ESSENTIAL (PRIMARY) HYPERTENSION                     

 

 2017            HOANG CAMERON MD            Ot            I25.10       
     ATHSCL HEART DISEASE OF NATIVE CORONARY                      

 

 2017            HOANG CAMERON MD            Ot            I25.2        
    OLD MYOCARDIAL INFARCTION                     

 

 2017            HOANG CAMERON MD            Ot            K21.9        
    GASTRO-ESOPHAGEAL REFLUX DISEASE WITHOUT                     

 

 2017            HOANG CAMERON MD            Ot            R07.9        
    CHEST PAIN, UNSPECIFIED                     

 

 2017            HOANG CAMERON MD            Ot            Z79.82       
     LONG TERM (CURRENT) USE OF ASPIRIN                     

 

 2017            HOANG CAMERON MD            Ot            Z91.19       
     PATIENT'S NONCOMPLIANCE W [facility]                     

 

 2017            HOANG CAMERON MD            Ot            Z95.5        
    PRESENCE OF CORONARY ANGIOPLASTY IMPLANT                     

 

 2017            HOANG CAMERON MD            Ot            E78.5        
    HYPERLIPIDEMIA, UNSPECIFIED                     

 

 2017            HOANG CAMERON MD            Ot            F17.210      
      NICOTINE DEPENDENCE, CIGARETTES, UNCOMPL                     

 

 2017            HOANG CAMERON MD            Ot            I10          
  ESSENTIAL (PRIMARY) HYPERTENSION                     

 

 2017            HOANG CAMERON MD, Ot            I25.10       
     ATHSCL HEART DISEASE OF NATIVE CORONARY                      

 

 2017            HOANG CAMERON MD, Ot            I25.2        
    OLD MYOCARDIAL INFARCTION                     

 

 2017            HOANG CAMERON MD, Ot            K21.9        
    GASTRO-ESOPHAGEAL REFLUX DISEASE WITHOUT                     

 

 2017            HOANG CAMERON MD            Ot            R07.9        
    CHEST PAIN, UNSPECIFIED                     

 

 2017            HOANG CAMERON MD, Ot            Z79.82       
     LONG TERM (CURRENT) USE OF ASPIRIN                     

 

 2017            HOANG CAMERON MD            Ot            Z91.19       
     PATIENT'S NONCOMPLIANCE W [facility]                     

 

 2017            HOANG CAMERON MD, Ot            Z95.5        
    PRESENCE OF CORONARY ANGIOPLASTY IMPLANT                     

 

 2018                         Ot            786.09            RESPIRATORY 
ABNORM NEC                     

 

 2018                         Ot            786.50            CHEST PAIN 
NOS                     

 

 2018            VERONIQUE DARNELL            Ot            
272.4            HYPERLIPIDEMIA NEC/NOS                     

 

 2018            VERONIQUE DARNELL            Ot            
305.1            TOBACCO USE DISORDER                     

 

 2018            VERONIQUE DARNELL            Ot            
401.9            HYPERTENSION NOS                     

 

 2018            VERONIQUE DARNELL            Ot            
414.00            CORON ATHEROSCLER NOS TYPE VESSEL, NATIV                     

 

 2018            JACY NATION MD            Ot            E78.2       
     MIXED HYPERLIPIDEMIA                     

 

 2018            JACY NATION MD, Ot            I10         
   ESSENTIAL (PRIMARY) HYPERTENSION                     

 

 2018            JACY NATION MD            Ot            I25.10      
      ATHSCL HEART DISEASE OF NATIVE CORONARY                      

 

 2018            JACY NATION MD            Ot            R07.89      
      OTHER CHEST PAIN                     

 

 2018            JACY NATION MD            Ot            Z72.0       
     TOBACCO USE                     

 

 01/10/2018            JACY NATION MD            Ot            E78.00      
      PURE HYPERCHOLESTEROLEMIA, UNSPECIFIED                     

 

 01/10/2018            JACY NATION MD            Ot            F17.210     
       NICOTINE DEPENDENCE, CIGARETTES, UNCOMPL                     

 

 01/10/2018            JACY NATION MD            Ot            I10         
   ESSENTIAL (PRIMARY) HYPERTENSION                     

 

 01/10/2018            JACY NATION MD            Ot            I21.19      
      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                     

 

 01/10/2018            JACY NATION MD            Ot            I25.10      
      ATHSCL HEART DISEASE OF NAT[place] CORONARY                      

 

 01/10/2018            JACY NATION MD            Ot            K21.9       
     GASTRO-ESOPHAGEAL REFLUX DISEASE WITHOUT                     

 

 01/10/2018            JACY NATION MD            Ot            Z91.14      
      PATIENT'S OTHER NONCOMPLIANCE WITH MEDIC                     

 

 01/10/2018            JACY NATION MD            Ot            Z95.5       
     PRESENCE OF CORONARY ANGIOPLASTY IMPLANT                     

 

 01/10/2018                         Ot            786.09            RESPIRATORY 
ABNORM NEC                     

 

 01/10/2018                         Ot            786.50            CHEST PAIN 
NOS                     

 

 01/10/2018            VERONIQUE DARNELL            Ot            
272.4            HYPERLIPIDEMIA NEC/NOS                     

 

 01/10/2018            VERONIQUE DARNELL            Ot            
305.1            TOBACCO USE DISORDER                     

 

 01/10/2018            VERONIQUE DARNELL            Ot            
401.9            HYPERTENSION NOS                     

 

 01/10/2018            VERONIQUE DARNELL            Ot            
414.00            CORON ATHEROSCLER NOS TYPE VESSEL, NATIV                     

 

 01/10/2018            JACY NATION MD            Ot            E78.2       
     MIXED HYPERLIPIDEMIA                     

 

 01/10/2018            JACY NATION MD            Ot            I10         
   ESSENTIAL (PRIMARY) HYPERTENSION                     

 

 01/10/2018            JACY NATION MD            Ot            I25.10      
      ATHSCL HEART DISEASE OF NATIVE CORONARY                      

 

 01/10/2018            JACY NATION MD            Ot            R07.89      
      OTHER CHEST PAIN                     

 

 01/10/2018            JACY NATION MD            Ot            Z72.0       
     TOBACCO USE                     

 

 2018                         Ot            786.09            RESPIRATORY 
ABNORM NEC                     

 

 2018                         Ot            786.50            CHEST PAIN 
NOS                     

 

 2018            VERONIQUE DARNELL            Ot            
272.4            HYPERLIPIDEMIA NEC/NOS                     

 

 2018            VERONIQUE DARNELL            Ot            
305.1            TOBACCO USE DISORDER                     

 

 2018            VERONIQUE DARNELL            Ot            
401.9            HYPERTENSION NOS                     

 

 2018            VERONIQUE DARNELL            Ot            
414.00            CORON ATHEROSCLER NOS TYPE VESSEL, NATIV                     

 

 2018            JACY NATION MD            Ot            E78.2       
     MIXED HYPERLIPIDEMIA                     

 

 2018            JACY NATION MD            Ot            I10         
   ESSENTIAL (PRIMARY) HYPERTENSION                     

 

 2018            JACY NATION MD            Ot            I25.10      
      ATHSCL HEART DISEASE OF NATIVE CORONARY                      

 

 2018            JACY NATION MD            Ot            R07.89      
      OTHER CHEST PAIN                     

 

 2018            JACY NATION MD            Ot            Z72.0       
     TOBACCO USE                     

 

 2018                         Ot            786.09            RESPIRATORY 
ABNORM NEC                     

 

 2018                         Ot            786.50            CHEST PAIN 
NOS                     

 

 2018            VERONIQUE DARNELL            Ot            
272.4            HYPERLIPIDEMIA NEC/NOS                     

 

 2018            VERONIQUE DARNELL            Ot            
305.1            TOBACCO USE DISORDER                     

 

 2018            VERONIQUE DARNELL            Ot            
401.9            HYPERTENSION NOS                     

 

 2018            VERONIQUE DARNELL            Ot            
414.00            CORON ATHEROSCLER NOS TYPE VESSEL, NATIV                     

 

 2018            JACY NATION MD            Ot            E78.2       
     MIXED HYPERLIPIDEMIA                     

 

 2018            JACY NATION MD            Ot            I10         
   ESSENTIAL (PRIMARY) HYPERTENSION                     

 

 2018            JACY NTAION MD            Ot            I25.10      
      ATHSCL HEART DISEASE OF NATIVE CORONARY                      

 

 2018            JACY NATION MD            Ot            R07.89      
      OTHER CHEST PAIN                     

 

 2018            JACY NATION MD            Ot            Z72.0       
     TOBACCO USE                     



                                                                               
                                                                               
                                                                               
                                                                               
                                                                               
                                                                               
                      



Procedures

      





 Code            Description            Performed By            Performed On   
     

 

             55632                                  ROUTINE VENIPUNCTURE       
                            2013        

 

             11061                                  CMP                        
           2013        

 

             52110                                  BNP                        
           2013        

 

             99674                                  TSH                        
           2013        

 

             20826                                  CBC                        
           2013        

 

             04555                                  CRP HS (CARDIO)            
                       2013        

 

             67868                                  EKG, TRACING (IN-HOUSE)    
                               2013        

 

             PIETER BACH                
                   2013        

 

             00.41                                  PROCEDURE ON TWO VESSELS   
                                2013        

 

             00.45                                  INSERTION OF ONE VASCULAR 
STENT                                   2013        

 

             00.66                                  PERCUTANEOUS TRANSLUMINAL 
CORONARY ANGIO                                   2013        

 

             36.07                                  INSRT OF DRUG-ELUTING CORON 
ARTERY STENT                                   2013        

 

             37.22                                  LEFT HEART CARDIAC CATH    
                               2013        

 

             88.53                                  LT HEART ANGIOCARDIOGRAM   
                                2013        

 

             88.56                                  CORONAR ARTERIOGR-2 CATH   
                                2013        

 

             Cardiolog                                  Emily Nationsabine          
                         2013        

 

             75553                                  STRESS TEST, CARDIAC (
SPECIFY TYPE)                                   10/04/2013        

 

             37236                                  UA LONG DIP                
                   2014        

 

             534370P                                  DILATION OF 1 COR ART 
WITH DRUG-ELUT INT                                   2018        

 

             [id number]                                  MEASURE OF CARDIAC SAMPL 
  PRESSURE, L H                                   2018        

 

             A1062ND                                  FLUOROSCOPY OF MULT COR 
ART USING L OSM                                    2018        

 

             [id number]                                  FLUOROSCOPY OF LEFT HEART 
USING LOW OSMO                                   2018        



                                                            



Results

      





 Test            Result            Range        









 Complete blood count (CBC) with automated white blood cell (WBC) differential 
- 17 09:46         









 Blood leukocytes automated count (number/volume)            9.0 10*3/uL       
     4.3-11.0        

 

 Blood erythrocytes automated count (number/volume)            4.99 10*6/uL    
        4.35-5.85        

 

 Venous blood hemoglobin measurement (mass/volume)            15.5 g/dL        
    13.3-17.7        

 

 Blood hematocrit (volume fraction)            44 %            40-54        

 

 Automated erythrocyte mean corpuscular volume            88 [foz_us]          
  80-99        

 

 Automated erythrocyte mean corpuscular hemoglobin (mass per erythrocyte)      
      31 pg            25-34        

 

 Automated erythrocyte mean corpuscular hemoglobin concentration measurement (
mass/volume)            35 g/dL            32-36        

 

 Automated erythrocyte distribution width ratio            13.4 %            
10.0-14.5        

 

 Automated blood platelet count (count/volume)            186 10*3/uL          
  130-400        

 

 Automated blood platelet mean volume measurement            9.9 [foz_us]      
      7.4-10.4        

 

 Automated blood neutrophils/100 leukocytes            64 %            42-75   
     

 

 Automated blood lymphocytes/100 leukocytes            27 %            12-44   
     

 

 Blood monocytes/100 leukocytes            8 %            0-12        

 

 Automated blood eosinophils/100 leukocytes            1 %            0-10     
   

 

 Automated blood basophils/100 leukocytes            0 %            0-10        

 

 Blood neutrophils automated count (number/volume)            5.8 10*3         
   1.8-7.8        

 

 Blood lymphocytes automated count (number/volume)            2.4 10*3         
   1.0-4.0        

 

 Blood monocytes automated count (number/volume)            0.7 10*3            
0.0-1.0        

 

 Automated eosinophil count            0.1 10*3/uL            0.0-0.3        

 

 Automated blood basophil count (count/volume)            0.0 10*3/uL          
  0.0-0.1        









 PT panel in platelet poor plasma by coagulation assay - 17 09:46         









 Prothrombin time (PT) in platelet poor plasma by coagulation assay            
12.3 s            12.2-14.7        

 

 INR in platelet poor plasma or blood by coagulation assay            0.9      
       0.8-1.4        









 Activated partial thromboplastin time (aPTT) in platelet poor plasma 
bycoagulation assay - 17 09:46         









 Activated partial thromboplastin time (aPTT) in platelet poor plasma 
bycoagulation assay            33 s            24-35        









 Comprehensive metabolic panel - 17 09:46         









 Serum or plasma sodium measurement (moles/volume)            138 mmol/L       
     135-145        

 

 Serum or plasma potassium measurement (moles/volume)            4.0 mmol/L    
        3.6-5.0        

 

 Serum or plasma chloride measurement (moles/volume)            104 mmol/L     
               

 

 Carbon dioxide            26 mmol/L            21-32        

 

 Serum or plasma anion gap determination (moles/volume)            8 mmol/L    
        5-14        

 

 Serum or plasma urea nitrogen measurement (mass/volume)            17 mg/dL   
         7-18        

 

 Serum or plasma creatinine measurement (mass/volume)            1.20 mg/dL    
        0.60-1.30        

 

 Serum or plasma urea nitrogen/creatinine mass ratio            14             
NRG        

 

 Serum or plasma creatinine measurement with calculation of estimated 
glomerular filtration rate            >             NRG        

 

 Serum or plasma glucose measurement (mass/volume)            161 mg/dL        
            

 

 Serum or plasma calcium measurement (mass/volume)            9.4 mg/dL        
    8.5-10.1        

 

 Serum or plasma total bilirubin measurement (mass/volume)            0.3 mg/dL
            0.1-1.0        

 

 Serum or plasma alkaline phosphatase measurement (enzymatic activity/volume)  
          76 U/L                    

 

 Serum or plasma aspartate aminotransferase measurement (enzymatic activity/
volume)            17 U/L            5-34        

 

 Serum or plasma alanine aminotransferase measurement (enzymatic activity/volume
)            16 U/L            0-55        

 

 Serum or plasma protein measurement (mass/volume)            7.1 g/dL         
   6.4-8.2        

 

 Serum or plasma albumin measurement (mass/volume)            4.2 g/dL         
   3.2-4.5        









 Magnesium - 17 09:46         









 Magnesium            1.8 mg/dL            1.8-2.4        









 Serum or plasma troponin i.cardiac measurement (mass/volume) - 17 09:46 
        









 Serum or plasma troponin i.cardiac measurement (mass/volume)            < ng/
mL            <0.30        









 Myoglobin, serum - 17 09:46         









 Myoglobin, serum            33.5 ng/mL            10.0-92.0        









 Lipase - 17 09:46         









 Lipase            27 U/L            8-78        









 Fibrin D-dimer FEU measurement in platelet poor plasma (mass/volume) -  09:46         









 Fibrin D-dimer FEU measurement in platelet poor plasma (mass/volume)          
  0.28 ug/mL            0.00-0.49        









 Complete blood count (CBC) with automated white blood cell (WBC) differential 
- 17 15:45         









 Blood leukocytes automated count (number/volume)            10.2 10*3/uL      
      4.3-11.0        

 

 Blood erythrocytes automated count (number/volume)            4.82 10*6/uL    
        4.35-5.85        

 

 Venous blood hemoglobin measurement (mass/volume)            14.7 g/dL        
    13.3-17.7        

 

 Blood hematocrit (volume fraction)            43 %            40-54        

 

 Automated erythrocyte mean corpuscular volume            89 [foz_us]          
  80-99        

 

 Automated erythrocyte mean corpuscular hemoglobin (mass per erythrocyte)      
      31 pg            25-34        

 

 Automated erythrocyte mean corpuscular hemoglobin concentration measurement (
mass/volume)            34 g/dL            32-36        

 

 Automated erythrocyte distribution width ratio            13.5 %            
10.0-14.5        

 

 Automated blood platelet count (count/volume)            173 10*3/uL          
  130-400        

 

 Automated blood platelet mean volume measurement            10.0 [foz_us]     
       7.4-10.4        

 

 Automated blood neutrophils/100 leukocytes            66 %            42-75   
     

 

 Automated blood lymphocytes/100 leukocytes            28 %            12-44   
     

 

 Blood monocytes/100 leukocytes            5 %            0-12        

 

 Automated blood eosinophils/100 leukocytes            1 %            0-10     
   

 

 Automated blood basophils/100 leukocytes            0 %            0-10        

 

 Blood neutrophils automated count (number/volume)            6.7 10*3         
   1.8-7.8        

 

 Blood lymphocytes automated count (number/volume)            2.9 10*3         
   1.0-4.0        

 

 Blood monocytes automated count (number/volume)            0.5 10*3            
0.0-1.0        

 

 Automated eosinophil count            0.1 10*3/uL            0.0-0.3        

 

 Automated blood basophil count (count/volume)            0.0 10*3/uL          
  0.0-0.1        









 Comprehensive metabolic panel - 17 15:45         









 Serum or plasma sodium measurement (moles/volume)            140 mmol/L       
     135-145        

 

 Serum or plasma potassium measurement (moles/volume)            4.6 mmol/L    
        3.6-5.0        

 

 Serum or plasma chloride measurement (moles/volume)            106 mmol/L     
               

 

 Carbon dioxide            27 mmol/L            21-32        

 

 Serum or plasma anion gap determination (moles/volume)            7 mmol/L    
        5-14        

 

 Serum or plasma urea nitrogen measurement (mass/volume)            14 mg/dL   
         7-18        

 

 Serum or plasma creatinine measurement (mass/volume)            0.95 mg/dL    
        0.60-1.30        

 

 Serum or plasma urea nitrogen/creatinine mass ratio            15             
NRG        

 

 Serum or plasma creatinine measurement with calculation of estimated 
glomerular filtration rate            >             NRG        

 

 Serum or plasma glucose measurement (mass/volume)            114 mg/dL        
            

 

 Serum or plasma calcium measurement (mass/volume)            9.0 mg/dL        
    8.5-10.1        

 

 Serum or plasma total bilirubin measurement (mass/volume)            0.6 mg/dL
            0.1-1.0        

 

 Serum or plasma alkaline phosphatase measurement (enzymatic activity/volume)  
          61 U/L                    

 

 Serum or plasma aspartate aminotransferase measurement (enzymatic activity/
volume)            16 U/L            5-34        

 

 Serum or plasma alanine aminotransferase measurement (enzymatic activity/volume
)            15 U/L            0-55        

 

 Serum or plasma protein measurement (mass/volume)            6.5 g/dL         
   6.4-8.2        

 

 Serum or plasma albumin measurement (mass/volume)            3.9 g/dL         
   3.2-4.5        









 Lipid 1996 panel - 17 15:45         









 Serum or plasma triglyceride measurement (mass/volume)            50 mg/dL    
        <150        

 

 Serum or plasma cholesterol measurement (mass/volume)            148 mg/dL    
        < 200        

 

 Serum or plasma cholesterol in HDL measurement (mass/volume)            34 mg/
dL            40-60        

 

 Cholesterol in LDL [mass/volume] in serum or plasma by direct assay            
110 mg/dL            1-129        

 

 Serum or plasma cholesterol in VLDL measurement (mass/volume)            10 mg/
dL            5-40        









 Serum or plasma creatine kinase measurement (enzymatic activity/volume) -  15:45         









 Serum or plasma creatine kinase measurement (enzymatic activity/volume)       
     127 U/L                    









 Serum or plasma troponin i.cardiac measurement (mass/volume) - 17 15:45 
        









 Serum or plasma troponin i.cardiac measurement (mass/volume)            < ng/
mL            <0.30        









 Myoglobin, serum - 17 15:45         









 Myoglobin, serum            38.1 ng/mL            10.0-92.0        









 Complete blood count (CBC) with automated white blood cell (WBC) differential 
- 18 12:22         









 Blood leukocytes automated count (number/volume)            12.3 10*3/uL      
      4.3-11.0        

 

 Blood erythrocytes automated count (number/volume)            5.11 10*6/uL    
        4.35-5.85        

 

 Venous blood hemoglobin measurement (mass/volume)            15.9 g/dL        
    13.3-17.7        

 

 Blood hematocrit (volume fraction)            45 %            40-54        

 

 Automated erythrocyte mean corpuscular volume            88 [foz_us]          
  80-99        

 

 Automated erythrocyte mean corpuscular hemoglobin (mass per erythrocyte)      
      31 pg            25-34        

 

 Automated erythrocyte mean corpuscular hemoglobin concentration measurement (
mass/volume)            35 g/dL            32-36        

 

 Automated erythrocyte distribution width ratio            13.3 %            
10.0-14.5        

 

 Automated blood platelet count (count/volume)            196 10*3/uL          
  130-400        

 

 Automated blood platelet mean volume measurement            9.7 [foz_us]      
      7.4-10.4        

 

 Automated blood neutrophils/100 leukocytes            73 %            42-75   
     

 

 Automated blood lymphocytes/100 leukocytes            20 %            12-44   
     

 

 Blood monocytes/100 leukocytes            7 %            0-12        

 

 Automated blood eosinophils/100 leukocytes            0 %            0-10     
   

 

 Automated blood basophils/100 leukocytes            0 %            0-10        

 

 Blood neutrophils automated count (number/volume)            9.0 10*3         
   1.8-7.8        

 

 Blood lymphocytes automated count (number/volume)            2.4 10*3         
   1.0-4.0        

 

 Blood monocytes automated count (number/volume)            0.8 10*3            
0.0-1.0        

 

 Automated eosinophil count            0.0 10*3/uL            0.0-0.3        

 

 Automated blood basophil count (count/volume)            0.0 10*3/uL          
  0.0-0.1        









 PT panel in platelet poor plasma by coagulation assay - 18 12:22         









 Prothrombin time (PT) in platelet poor plasma by coagulation assay            
12.8 s            12.2-14.7        

 

 INR in platelet poor plasma or blood by coagulation assay            1.0      
       0.8-1.4        









 Activated partial thromboplastin time (aPTT) in platelet poor plasma 
bycoagulation assay - 18 12:22         









 Activated partial thromboplastin time (aPTT) in platelet poor plasma 
bycoagulation assay            33 s            24-35        









 Comprehensive metabolic panel - 18 12:22         









 Serum or plasma sodium measurement (moles/volume)            136 mmol/L       
     135-145        

 

 Serum or plasma potassium measurement (moles/volume)            4.0 mmol/L    
        3.6-5.0        

 

 Serum or plasma chloride measurement (moles/volume)            101 mmol/L     
               

 

 Carbon dioxide            26 mmol/L            21-32        

 

 Serum or plasma anion gap determination (moles/volume)            9 mmol/L    
        5-14        

 

 Serum or plasma urea nitrogen measurement (mass/volume)            13 mg/dL   
         7-18        

 

 Serum or plasma creatinine measurement (mass/volume)            1.08 mg/dL    
        0.60-1.30        

 

 Serum or plasma urea nitrogen/creatinine mass ratio            12             
NRG        

 

 Serum or plasma creatinine measurement with calculation of estimated 
glomerular filtration rate            >             NRG        

 

 Serum or plasma glucose measurement (mass/volume)            143 mg/dL        
            

 

 Serum or plasma calcium measurement (mass/volume)            9.4 mg/dL        
    8.5-10.1        

 

 Serum or plasma total bilirubin measurement (mass/volume)            0.4 mg/dL
            0.1-1.0        

 

 Serum or plasma alkaline phosphatase measurement (enzymatic activity/volume)  
          65 U/L                    

 

 Serum or plasma aspartate aminotransferase measurement (enzymatic activity/
volume)            31 U/L            5-34        

 

 Serum or plasma alanine aminotransferase measurement (enzymatic activity/volume
)            20 U/L            0-55        

 

 Serum or plasma protein measurement (mass/volume)            6.8 g/dL         
   6.4-8.2        

 

 Serum or plasma albumin measurement (mass/volume)            4.3 g/dL         
   3.2-4.5        









 Magnesium - 18 12:22         









 Magnesium            1.7 mg/dL            1.8-2.4        









 Serum or plasma troponin i.cardiac measurement (mass/volume) - 18 12:22 
        









 Serum or plasma troponin i.cardiac measurement (mass/volume)            1.11 ng
/mL            <0.30        









 Myoglobin, serum - 18 12:22         









 Myoglobin, serum            483.6 ng/mL            10.0-92.0        









 Automated blood complete blood count (hemogram) panel - 01/10/18 05:00         









 Blood leukocytes automated count (number/volume)            9.9 10*3/uL       
     4.3-11.0        

 

 Blood erythrocytes automated count (number/volume)            4.78 10*6/uL    
        4.35-5.85        

 

 Venous blood hemoglobin measurement (mass/volume)            14.9 g/dL        
    13.3-17.7        

 

 Blood hematocrit (volume fraction)            43 %            40-54        

 

 Automated erythrocyte mean corpuscular volume            90 [foz_us]          
  80-99        

 

 Automated erythrocyte mean corpuscular hemoglobin (mass per erythrocyte)      
      31 pg            25-34        

 

 Automated erythrocyte mean corpuscular hemoglobin concentration measurement (
mass/volume)            35 g/dL            32-36        

 

 Automated erythrocyte distribution width ratio            13.4 %            
10.0-14.5        

 

 Automated blood platelet count (count/volume)            175 10*3/uL          
  130-400        

 

 Automated blood platelet mean volume measurement            10.1 [foz_us]     
       7.4-10.4        









 Whole blood basic metabolic panel - 01/10/18 05:00         









 Serum or plasma sodium measurement (moles/volume)            140 mmol/L       
     135-145        

 

 Serum or plasma potassium measurement (moles/volume)            4.4 mmol/L    
        3.6-5.0        

 

 Serum or plasma chloride measurement (moles/volume)            107 mmol/L     
               

 

 Carbon dioxide            22 mmol/L            21-32        

 

 Serum or plasma anion gap determination (moles/volume)            11 mmol/L   
         5-14        

 

 Serum or plasma urea nitrogen measurement (mass/volume)            11 mg/dL   
         7-18        

 

 Serum or plasma creatinine measurement (mass/volume)            0.94 mg/dL    
        0.60-1.30        

 

 Serum or plasma urea nitrogen/creatinine mass ratio            12             
NRG        

 

 Serum or plasma creatinine measurement with calculation of estimated 
glomerular filtration rate            >             NRG        

 

 Serum or plasma glucose measurement (mass/volume)            131 mg/dL        
            

 

 Serum or plasma calcium measurement (mass/volume)            9.2 mg/dL        
    8.5-10.1        









 Serum or plasma troponin i.cardiac measurement (mass/volume) - 01/10/18 05:00 
        









 Serum or plasma troponin i.cardiac measurement (mass/volume)            12.63 
ng/mL            <0.30        









 Lipid 1996 panel - 01/10/18 05:00         









 Serum or plasma triglyceride measurement (mass/volume)            65 mg/dL    
        <150        

 

 Serum or plasma cholesterol measurement (mass/volume)            124 mg/dL    
        < 200        

 

 Serum or plasma cholesterol in HDL measurement (mass/volume)            29 mg/
dL            40-60        

 

 Cholesterol in LDL [mass/volume] in serum or plasma by direct assay            
85 mg/dL            1-129        

 

 Serum or plasma cholesterol in VLDL measurement (mass/volume)            13 mg/
dL            5-40        









 Complete blood count (CBC) with automated white blood cell (WBC) differential 
- 19 13:37         









 Blood leukocytes automated count (number/volume)            12.9 10*3/uL      
      4.3-11.0        

 

 Blood erythrocytes automated count (number/volume)            4.79 10*6/uL    
        4.35-5.85        

 

 Venous blood hemoglobin measurement (mass/volume)            14.9 g/dL        
    13.3-17.7        

 

 Blood hematocrit (volume fraction)            42 %            40-54        

 

 Automated erythrocyte mean corpuscular volume            89 [foz_us]          
  80-99        

 

 Automated erythrocyte mean corpuscular hemoglobin (mass per erythrocyte)      
      31 pg            25-34        

 

 Automated erythrocyte mean corpuscular hemoglobin concentration measurement (
mass/volume)            35 g/dL            32-36        

 

 Automated erythrocyte distribution width ratio            13.7 %            
10.0-14.5        

 

 Automated blood platelet count (count/volume)            264 10*3/uL          
  130-400        

 

 Automated blood platelet mean volume measurement            9.7 [foz_us]      
      7.4-10.4        

 

 Automated blood neutrophils/100 leukocytes            75 %            42-75   
     

 

 Automated blood lymphocytes/100 leukocytes            20 %            12-44   
     

 

 Blood monocytes/100 leukocytes            5 %            0-12        

 

 Automated blood eosinophils/100 leukocytes            0 %            0-10     
   

 

 Automated blood basophils/100 leukocytes            0 %            0-10        

 

 Blood neutrophils automated count (number/volume)            9.6 10*3         
   1.8-7.8        

 

 Blood lymphocytes automated count (number/volume)            2.6 10*3         
   1.0-4.0        

 

 Blood monocytes automated count (number/volume)            0.6 10*3            
0.0-1.0        

 

 Automated eosinophil count            0.1 10*3/uL            0.0-0.3        

 

 Automated blood basophil count (count/volume)            0.0 10*3/uL          
  0.0-0.1        









 PT panel in platelet poor plasma by coagulation assay - 19 13:37         









 Prothrombin time (PT) in platelet poor plasma by coagulation assay            
12.8 s            12.2-14.7        

 

 INR in platelet poor plasma or blood by coagulation assay            1.0      
       0.8-1.4        









 Activated partial thromboplastin time (aPTT) in platelet poor plasma 
bycoagulation assay - 19 13:37         









 Activated partial thromboplastin time (aPTT) in platelet poor plasma 
bycoagulation assay            35 s            24-35        









 Comprehensive metabolic panel - 19 13:37         









 Serum or plasma sodium measurement (moles/volume)            135 mmol/L       
     135-145        

 

 Serum or plasma potassium measurement (moles/volume)            4.3 mmol/L    
        3.6-5.0        

 

 Serum or plasma chloride measurement (moles/volume)            101 mmol/L     
               

 

 Carbon dioxide            23 mmol/L            21-32        

 

 Serum or plasma anion gap determination (moles/volume)            11 mmol/L   
         5-14        

 

 Serum or plasma urea nitrogen measurement (mass/volume)            10 mg/dL   
         7-18        

 

 Serum or plasma creatinine measurement (mass/volume)            1.04 mg/dL    
        0.60-1.30        

 

 Serum or plasma urea nitrogen/creatinine mass ratio            10             
NRG        

 

 Serum or plasma creatinine measurement with calculation of estimated 
glomerular filtration rate            >             NRG        

 

 Serum or plasma glucose measurement (mass/volume)            149 mg/dL        
            

 

 Serum or plasma calcium measurement (mass/volume)            9.1 mg/dL        
    8.5-10.1        

 

 Serum or plasma total bilirubin measurement (mass/volume)            0.2 mg/dL
            0.1-1.0        

 

 Serum or plasma alkaline phosphatase measurement (enzymatic activity/volume)  
          74 U/L                    

 

 Serum or plasma aspartate aminotransferase measurement (enzymatic activity/
volume)            23 U/L            5-34        

 

 Serum or plasma alanine aminotransferase measurement (enzymatic activity/volume
)            23 U/L            0-55        

 

 Serum or plasma protein measurement (mass/volume)            6.9 g/dL         
   6.4-8.2        

 

 Serum or plasma albumin measurement (mass/volume)            4.2 g/dL         
   3.2-4.5        

 

 CALCIUM CORRECTED            8.9 mg/dL            8.5-10.1        









 Magnesium - 19 13:37         









 Magnesium            2.0 mg/dL            1.8-2.4        









 Serum or plasma troponin i.cardiac measurement (mass/volume) - 19 13:37 
        









 Serum or plasma troponin i.cardiac measurement (mass/volume)            0.150 
ng/mL            <0.028        









 Myoglobin, serum - 19 13:37         









 Myoglobin, serum            36.3 ng/mL            10.0-92.0        



                                                                                



Encounters

      





 ACCT No.            Visit Date/Time            Discharge            Status    
        Pt. Type            Provider            Facility            Loc./Unit  
          Complaint        

 

 [id number]            2014 12:38:00            2014 23:59:59          
  CLS            Outpatient            MILAN AKBAR DO                        
                       

 

 [id number]            2014 14:32:00            2014 23:59:59          
  CLS            Outpatient            TEODORO FRANCO                   
                            

 

 [id number]            2014 15:52:00            2014 23:59:59          
  CLS            Outpatient            SHYANNE LEYVA MD                       
                        

 

 [id number]            2013 08:48:00            2013 23:59:59          
  CLS            Outpatient            MILAN AKBAR DO                        
                       

 

 [id number]            2013 14:02:00            2013 23:59:59          
  CLS            Outpatient            TERESITA MARISCAL               
                                

 

 [id number]            2013 15:26:00            2013 23:59:59          
  CLS            Outpatient            MILAN AKBAR DO                        
                       

 

 [id number]            2019 14:30:00            2019 23:59:59          
  CLS            [facility]                     

 

 [id number]            2018 11:00:00            2018 23:59:59    
        CLS            Preadmit            JACY NATION MD            [facility] 
[facility]            CARD            I25.10 CAD        

 

 [id number]            2018 12:27:00            01/10/2018 10:05:00    
        DIS            Inpatient            JACY NATION MD            [facility] 
[facility]            ICU            CHEST PAIN        

 

 [id number]            2017 10:55:00            2017 17:30:00    
        DIS            Inpatient            HOANG CAMERON MD            [facility] 
[facility]            4TH            CHEST PAIN        

 

 [id number]            2017 07:11:00            2017 23:59:59    
        CLS            Outpatient            JACY NAITON MD            [facility] 
[facility]            CARD            CHEST PAIN,HTN,HLP,
TOBACCO USE        

 

 [id number]            2015 23:32:00            2015 10:20:00    
        DIS            Inpatient            STEFAN DOUGLAS FACC, LES CROFT [facility]      
      [facility]            CSD            CHEST PAIN    
    

 

 [id number]            2015 08:15:00            2015 23:59:59    
        CLS            Outpatient            VERONIQUE DARNELL    
        [facility]            CARD            CAD,HLP    
    

 

 [id number]            2015 14:00:00            2015 23:59:59    
        CLS            Preadmit            JACY NATION MD            [facility] 
[facility]            CARD            CAD,HLP        

 

 [id number]            2013 11:53:00            2013 00:01:00    
        DIS            Outpatient            SWATI CONLEY MD            
[facility]            CARD            CHEST PAIN        

 

 [id number]            2013 16:30:00            2013 10:05:00    
        DIS            Inpatient            JACY NATION MD            [facility] 
[facility]            ICU            CP        

 

 [id number]            07/10/2013 12:46:00            2013 11:00:00    
        DIS            Inpatient            JACY NATION MD            [facility] 
[facility]            CSD            CHEST PAIN        

 

 [id number]            2013 12:32:00            2013 13:25:00    
        DIS            Outpatient            JACY NATION MD            [facility] 
[facility]            CATH            CP        

 

 [id number]            2019 07:45:00                         PEN       
     Preadmit            JACY NATION MD            [facility]            CARD            CAD, CHEST PAIN, HTN, HYPERLIPIDEMIA      
  

 

 [id number]            2019 12:00:00                         PEN       
     Preadmit            JACY NATION MD            [facility]            CARD            CAD, CHEST PAIN, HTN, HYPERLIPIDEMIA      
  

 

 [id number]            2019 13:49:00                                   
   Document Registration                                                       
     

 

 [id number]            2013 12:15:00                                   
   Document Registration                                                       
     

 

 [id number]            2013 14:28:00                                   
   Document Registration

## 2019-02-16 NOTE — XMS REPORT
[facility]

 Created on: 2018



Jose Mayes

External Reference #: [id number]

: 1965

Sex: Male



Demographics







 Address  [address] [place]  [address]

 

 Preferred Language  Unknown

 

 Marital Status  Unknown

 

 Orthodox Affiliation  Unknown

 

 Race  Unknown

 

 Ethnic Group  Unknown





Author







 Author  JUVE WEST

 

 [facility]

 

 Address  [address] [place]  [address]



 

 Phone  [phone]







Care Team Providers







 Care Team Member Name  Role  Phone

 

 WESTJUVE  Unavailable  [phone]







PROBLEMS







 Type  Condition  ICD9-CM Code  RZN45-CR Code  Onset Dates  Condition Status  
SNOMED Code

 

 Problem  Toxic effect of venom  989.5        Active  56584774

 

 Problem  Chest pain, unspecified  786.50        Active  12392675

 

 Problem  Chest pain, other  786.59        Active  16210818

 

 Problem  Counseling on substance use and abuse  V65.42        Active  058380645

 

 Problem  Health examination of defined subpopulation  V70.5        Active  
[id number]

 

 Problem  Coronary atherosclerosis of unspecified type of vessel, native or 
graft  414.00        Active  974348122

 

 Problem  Acute sinusitis, unspecified  461.9        Active  08147997

 

 Problem  Hypertrophy of breast  611.1        Active  652401804

 

 Problem  Dizziness and giddiness  780.4        Active  260856144

 

 Problem  Acute bronchitis  466.0        Active  41721793

 

 Problem  Periapical abscess without sinus  522.5        Active  261722245







ALLERGIES

No Information



ENCOUNTERS







 Encounter  Location  Date  Diagnosis

 

 [facility]  [id number] [place] [phone]
[phone]     

 

 [facility]  [id number] [place] [address]
[phone]    Sore throat J02.9 ; Cough R05 ; Tobacco abuse Z72.0 and 
Acute non-recurrent maxillary sinusitis J01.00

 

 [facility]  [id number] [place] [address] 
[id number]  21 Sep, 2017  Dental examination Z01.20

 

 [facility]  [id number] [place] [address]-
8034  14 2015   

 

 [facility]  [address] [address]-
33[address]     

 

 [facility]  [address] [id number] [place] [address]
84[address]  08 Sep, 2014   

 

 [facility] [place] [facility]  [address] [id number] [place] [address]-
9673  08 Sep, 2014   

 

 [facility] [place] [facility]  [address] [id number] [place] [address]-
7277  06 May, 2014   

 

 [facility] [place] [facility]  [address] [id number] [place] [address]-
6908  06 May, 2014   

 

 [facility]  [address] [id number] [place] [address]-
3005     

 

 [facility] [place] [facility]  [address] [id number] [place] [address]-
8692     

 

 [facility]  [address] [id number] [place] [address]-
2753     

 

 [facility] [place] [facility]  [address] [id number] [place] [address]-
3495     

 

 [facility] [place] [facility]  [address] [id number] [place] [address]-
5396  24 Oct, 2013   

 

 [facility] [place] [facility]  [address] [id number] [place] [address]-
9135  24 Oct, 2013   

 

 [facility] [place] [facility]  [address] [id number] [place] [address]-
4127  18 Oct, 2013   

 

 [facility] [place] [facility]  [address] [id number] [place] [address]-
4844  18 Oct, 2013   

 

 [facility] [place] [facility]  [address] [id number] [place] [address]-
5770  08 Oct, 2013   

 

 [facility] [place] [facility]  [address] [id number] [place] [address]-
4540  04 Oct, 2013   

 

 [facility] [place] [facility]  [address] [id number] [place] [address]-
6305  10 Sep, 2013   

 

 [facility] [place] [facility]  [address] [id number] [place] [address]-
2886  28 May, 2013   

 

 [facility] [place] [facility]  [address] [id number] [place] [address]-
6590  16 2013   

 

 [facility] [place] [facility]  [address] [id number] [place]
7586  21 Mar, 2013   

 

 [facility] [place] [facility]  [address] [id number] [place] [address]-
4128  20 Mar, 2013   

 

 [facility] [place] [facility]  [address] [id number] [place] [address]-
9075  19 Mar, 2013   

 

 [facility] [place] [facility]  [address] [id number] [place] [address]-
7433  15 Sep, 2012   

 

 [facility] [place] [facility]  [address] [id number] [place] [address]-
3672  15 Sep, 2012   

 

 [facility] [place] [facility]  [address] [id number] [place] [address]-
1550  14 Sep, 2012   

 

 [facility] [place] [facility]  [address] [id number] [place] [address]-
4840  07 Aug, 2012   

 

 [facility] [place] [facility]  [address] [id number] [place] [address]-
1389     

 

 [facility] [place] [facility]  [address] [id number] [place] [address]-
9776     

 

 [facility] [place] [facility]  [address] [id number] [place] [address]-
65[address]     

 

 [facility] [place] [facility]  [address] [id number] [place] [address]-
3023     

 

 [facility] [place] [facility]  [address] [id number] [place] [address]-
6127     

 

 [facility] [place] [facility]  [address] [id number] [place] [address]-
6277  06 2012   

 

 [facility] [place] [facility]  [address] [id number] [place] [address]-
5183     

 

 [facility] [place] [facility]  [address] [facility] [id number] [place] [address]-
0719  31 May, 2012   







IMMUNIZATIONS

No Known Immunizations



SOCIAL HISTORY

Never Assessed



REASON FOR VISIT





PLAN OF CARE





VITAL SIGNS





MEDICATIONS

Unknown Medications



RESULTS

No Results



PROCEDURES

No Known procedures



INSTRUCTIONS





MEDICATIONS ADMINISTERED

No Known Medications



MEDICAL (GENERAL) HISTORY







 Type  Description  Date

 

 Medical History  Heart Attack 2014   

 

 Medical History  Arthritis   

 

 Medical History  Blood Thinner stopped    

 

 Hospitalization History  stint placed

## 2019-02-16 NOTE — CONSULTATION-CARDIOLOGY
HPI-Cardiology


Cardiology Consultation:


Date of Consultation


19


Time Seen by a Provider:  17:15


Date of Admission





Attending Physician


Sabra Wilson MD


Admitting Physician


No,Local Physician


Consulting Physician


LES AVILES MD, MA, FACP, FACC, FSCAI, CCDS





Primary cardiologist: Dr Nation





HPI:


Chief Complaint:


CC: Chest discomfort


54 yo man with known CAD with mild intermittent chest discomfort for several 

days/weeks, worse today, experienced in the L parasternal area, mild to mod, 

nonradiating, not associated with other symptoms, not relieved with s/l NTG, 

improved with narcotic analgesic, currently very mild.  Chronic shortness of 

breath. Chronic tobacco use. No palp or syncope or leg swelling





Review of Systems-Cardiology


Review of Systems


Constitutional:  No malaise, No tiredness, No weight loss, No weight gain


Eyes:  No vision change


Ears/Nose/Throat:  No ear discharge, No recent hearing loss


Respiratory:  As described under HPI


Cardiovascular:  As described under HPI


Gastrointestinal:  No constipation, No diarrhea, No vomiting


Genitourinary:  No dysuria, No hematuria, No urine frequency changes


Musculoskeletal:  No back pain, No joint pain


Skin:  No rash, No ulcerations


Psychiatric/Neurological:  No seizure, No focal weakness, No syncope


Hematologic:  No bleeding abnormalities





PMH-Social-Family Hx


Patient Social History


Alcohol Use:  Denies Use


Recreational Drug Use:  No


Smoking Status:  Current Everyday Smoker


Type Used:  Cigarettes


2nd Hand Smoke Exposure:  No


Recent Foreign Travel:  No


Recent Infectious Disease Expo:  No


Physical Abuse Screen:  No


Sexual Abuse:  No





Immunizations Up To Date


Tetanus Booster (TDap):  Less than 5yrs





Past Medical History


PMH


As described under Assessment.





Family Medical History


Family Medical History:  


Sister had CAD at an early age


Family History:  


Cardiovascular disease


  19 FATHER


Myocardial infarction


  19 FATHER





Allergies and Home Medications


Allergies


Uncoded Allergies:  


     bee sting (Adverse Reaction, Intermediate, swelling, 13)





Home Medications


Aspirin 81 Mg Tablet., 81 MG PO DAILY, (Reported)





Patient Home Medication List


Home Medication List Reviewed:  Yes





Physical Exam-Cardiology


Physical Exam


Vital Signs/I&O











 19





 13:34 13:45 15:28 15:47


 


Temp 98.0   


 


Pulse 77   70


 


Resp 16   


 


B/P (MAP) 116/79 (91)   


 


Pulse Ox 100  98 


 


O2 Delivery Room Air Nasal Cannula Room Air 


 


O2 Flow Rate  2.00  





Capillary Refill : Less Than 3 Seconds


Constitutional:  AAO x 3, well-developed, well-nourished, other (Thin-appearing)


HEENT:  PERRL; No EOMI, No xanthelasmas are seen


Neck:  No carotid bruit; carotid pulses are 2 + bilaterally, with good upstrokes


Respiratory:  No accessory muscle use; other (good bilat air entry, prolonged 

exp)


Cardiovascular:  regular rate-rhythm, S1 and S2, systolic murmur (soft LIZZY at 

card base)


Gastrointestinal:  No tender; soft; No guarding, No rebound; audible bowel 

sounds


Extremities:  No clubbing, No cyanosis, No significant edema


Neurologic/Psychiatric:  oriented x 3, grossly intact, power is 5/5 both on 

sides


Skin:  No rash on exposed areas, No ulcerations on exposed areas





Data Review


Labs


Laboratory Tests


19 13:37: 


White Blood Count 12.9H, Red Blood Count 4.79, Hemoglobin 14.9, Hematocrit 42, 

Mean Corpuscular Volume 89, Mean Corpuscular Hemoglobin 31, Mean Corpuscular 

Hemoglobin Concent 35, Red Cell Distribution Width 13.7, Platelet Count 264, 

Mean Platelet Volume 9.7, Neutrophils (%) (Auto) 75, Lymphocytes (%) (Auto) 20, 

Monocytes (%) (Auto) 5, Eosinophils (%) (Auto) 0, Basophils (%) (Auto) 0, 

Neutrophils # (Auto) 9.6H, Lymphocytes # (Auto) 2.6, Monocytes # (Auto) 0.6, 

Eosinophils # (Auto) 0.1, Basophils # (Auto) 0.0, Prothrombin Time 12.8, INR 

Comment 1.0, Activated Partial Thromboplast Time 35, Sodium Level 135, 

Potassium Level 4.3, Chloride Level 101, Carbon Dioxide Level 23, Anion Gap 11, 

Blood Urea Nitrogen 10, Creatinine 1.04, Estimat Glomerular Filtration Rate > 60

, BUN/Creatinine Ratio 10, Glucose Level 149H, Calcium Level 9.1, Corrected 

Calcium 8.9, Magnesium Level 2.0, Total Bilirubin 0.2, Aspartate Amino Transf (

AST/SGOT) 23, Alanine Aminotransferase (ALT/SGPT) 23, Alkaline Phosphatase 74, 

Myoglobin 36.3, Troponin I 0.150, Total Protein 6.9, Albumin 4.2





Laboratory Tests


19 13:37











A/P-Cardiology


Assessment/Admission Diagnosis





Chest discomfort w/o any evidence of MI, so far





CAD. Cath in  showed a patent stent in LCX that extends into an OM and 

there was 80% ostial stenosis of that OM; this was medically treated.  Had 

acute MI on 2018: subtotal occlusion in the RCA, treated with Alpine 

2.5 x 15 mm stent; proximal to the stent there was 40-50% stenosis that was 

treated conservatively





Chronic systolic CHF. LVEF 45-50% on cath of 2018





H/o relative intolerance to beta blocker, ACE inhibitor, or ARB, apparently due 

to chronically low bp that worsens with such agents





Hyperlipidemia, treated with atorvastatin that is followed by Dr Nation





Chronic tobacco use (smoking)





Discussion and Recomendations





* Treat with dual antiplatelet therapy, beta-blocker, and enoxaparin


* Monitor ECG and labs


* If evidence of ACS, then cath


* If no evidence of ACS, then conservative management


* Advised to quit smoking immediately and completely


* Further recs to be based on hosp course





Clinical Quality Measures


AMI/AHF:


ASA po Prior to arrival:  Yes





DVT/VTE Risk/Contraindication:


Risk Factor Score Per Nursin


RFS Level Per Nursing on Admit:  2=Moderate











LES AVILES MD FACP FACC CCDS 2019 17:16

## 2019-02-16 NOTE — NUR
MICHAEL BUTLER JR admitted to room CU4-1, with an admitting diagnosis of CP, on 02/16/19 
from ER via , accompanied by STAFF.MICHAEL BUTLER JR introduced to surroundings, call 
light, bed controls, phone, TV, temperature control, lights, meal times, smoking policy, 
visitor policy, side rail policy, bathrooms and showers.  Patient Rights given to patient in 
the handbook. MICHAEL BUTLER JR verbalizes understanding that [facility] is not responsible 
for the loss or damage to any personal effects or valuables that are kept in the patients 
posession during their hospitalization.  The following Patient Care Plans were discussed 
with the PT: Discharge Planning, PAIN,ANXIETY, and KNOWLEDGE DEFICIT. MICHAEL BUTLER JR 
verbalizes understanding of Interdisciplinary Patient Education. Patient and family were 
informed about the Rapid Response Team and its purpose.

## 2019-02-17 VITALS — DIASTOLIC BLOOD PRESSURE: 78 MMHG | SYSTOLIC BLOOD PRESSURE: 110 MMHG

## 2019-02-17 VITALS — SYSTOLIC BLOOD PRESSURE: 107 MMHG | DIASTOLIC BLOOD PRESSURE: 72 MMHG

## 2019-02-17 VITALS — SYSTOLIC BLOOD PRESSURE: 93 MMHG | DIASTOLIC BLOOD PRESSURE: 60 MMHG

## 2019-02-17 VITALS — DIASTOLIC BLOOD PRESSURE: 67 MMHG | SYSTOLIC BLOOD PRESSURE: 95 MMHG

## 2019-02-17 VITALS — DIASTOLIC BLOOD PRESSURE: 67 MMHG | SYSTOLIC BLOOD PRESSURE: 104 MMHG

## 2019-02-17 VITALS — SYSTOLIC BLOOD PRESSURE: 92 MMHG | DIASTOLIC BLOOD PRESSURE: 63 MMHG

## 2019-02-17 VITALS — SYSTOLIC BLOOD PRESSURE: 113 MMHG | DIASTOLIC BLOOD PRESSURE: 74 MMHG

## 2019-02-17 VITALS — SYSTOLIC BLOOD PRESSURE: 100 MMHG | DIASTOLIC BLOOD PRESSURE: 70 MMHG

## 2019-02-17 VITALS — SYSTOLIC BLOOD PRESSURE: 150 MMHG | DIASTOLIC BLOOD PRESSURE: 84 MMHG

## 2019-02-17 VITALS — SYSTOLIC BLOOD PRESSURE: 92 MMHG | DIASTOLIC BLOOD PRESSURE: 65 MMHG

## 2019-02-17 VITALS — DIASTOLIC BLOOD PRESSURE: 65 MMHG | SYSTOLIC BLOOD PRESSURE: 100 MMHG

## 2019-02-17 VITALS — SYSTOLIC BLOOD PRESSURE: 94 MMHG | DIASTOLIC BLOOD PRESSURE: 62 MMHG

## 2019-02-17 LAB
BASOPHILS # BLD AUTO: 0.1 10^3/UL (ref 0–0.1)
BASOPHILS NFR BLD AUTO: 1 % (ref 0–10)
BUN/CREAT SERPL: 11
CALCIUM SERPL-MCNC: 9.1 MG/DL (ref 8.5–10.1)
CHLORIDE SERPL-SCNC: 103 MMOL/L (ref 98–107)
CHOLEST SERPL-MCNC: 139 MG/DL (ref ?–200)
CO2 SERPL-SCNC: 23 MMOL/L (ref 21–32)
CREAT SERPL-MCNC: 1.1 MG/DL (ref 0.6–1.3)
EOSINOPHIL # BLD AUTO: 0.2 10^3/UL (ref 0–0.3)
EOSINOPHIL NFR BLD AUTO: 2 % (ref 0–10)
ERYTHROCYTE [DISTWIDTH] IN BLOOD BY AUTOMATED COUNT: 13.9 % (ref 10–14.5)
GFR SERPLBLD BASED ON 1.73 SQ M-ARVRAT: > 60 ML/MIN
GLUCOSE SERPL-MCNC: 119 MG/DL (ref 70–105)
HCT VFR BLD CALC: 43 % (ref 40–54)
HDLC SERPL-MCNC: 26 MG/DL (ref 40–60)
HGB BLD-MCNC: 14.8 G/DL (ref 13.3–17.7)
LYMPHOCYTES # BLD AUTO: 3.4 X 10^3 (ref 1–4)
LYMPHOCYTES NFR BLD AUTO: 36 % (ref 12–44)
MAGNESIUM SERPL-MCNC: 2.3 MG/DL (ref 1.8–2.4)
MANUAL DIFFERENTIAL PERFORMED BLD QL: NO
MCH RBC QN AUTO: 31 PG (ref 25–34)
MCHC RBC AUTO-ENTMCNC: 34 G/DL (ref 32–36)
MCV RBC AUTO: 89 FL (ref 80–99)
MONOCYTES # BLD AUTO: 0.8 X 10^3 (ref 0–1)
MONOCYTES NFR BLD AUTO: 8 % (ref 0–12)
NEUTROPHILS # BLD AUTO: 5.1 X 10^3 (ref 1.8–7.8)
NEUTROPHILS NFR BLD AUTO: 54 % (ref 42–75)
PLATELET # BLD: 249 10^3/UL (ref 130–400)
PMV BLD AUTO: 9.6 FL (ref 7.4–10.4)
POTASSIUM SERPL-SCNC: 4.4 MMOL/L (ref 3.6–5)
SODIUM SERPL-SCNC: 137 MMOL/L (ref 135–145)
TRIGL SERPL-MCNC: 105 MG/DL (ref ?–150)
VLDLC SERPL CALC-MCNC: 21 MG/DL (ref 5–40)
WBC # BLD AUTO: 9.4 10^3/UL (ref 4.3–11)

## 2019-02-17 PROCEDURE — B2151ZZ FLUOROSCOPY OF LEFT HEART USING LOW OSMOLAR CONTRAST: ICD-10-PCS | Performed by: INTERNAL MEDICINE

## 2019-02-17 PROCEDURE — 4A023N7 MEASUREMENT OF CARDIAC SAMPLING AND PRESSURE, LEFT HEART, PERCUTANEOUS APPROACH: ICD-10-PCS | Performed by: INTERNAL MEDICINE

## 2019-02-17 PROCEDURE — 027034Z DILATION OF CORONARY ARTERY, ONE ARTERY WITH DRUG-ELUTING INTRALUMINAL DEVICE, PERCUTANEOUS APPROACH: ICD-10-PCS | Performed by: INTERNAL MEDICINE

## 2019-02-17 PROCEDURE — B2111ZZ FLUOROSCOPY OF MULTIPLE CORONARY ARTERIES USING LOW OSMOLAR CONTRAST: ICD-10-PCS | Performed by: INTERNAL MEDICINE

## 2019-02-17 RX ADMIN — CLOPIDOGREL BISULFATE SCH MG: 75 TABLET, FILM COATED ORAL at 08:48

## 2019-02-17 RX ADMIN — SODIUM CHLORIDE SCH MLS/HR: 900 INJECTION, SOLUTION INTRAVENOUS at 23:00

## 2019-02-17 RX ADMIN — SODIUM CHLORIDE SCH MLS/HR: 900 INJECTION, SOLUTION INTRAVENOUS at 10:57

## 2019-02-17 RX ADMIN — ENOXAPARIN SODIUM SCH MG: 100 INJECTION SUBCUTANEOUS at 20:35

## 2019-02-17 RX ADMIN — ENOXAPARIN SODIUM SCH MG: 100 INJECTION SUBCUTANEOUS at 10:40

## 2019-02-17 RX ADMIN — ASPIRIN 81 MG CHEWABLE TABLET SCH MG: 81 TABLET CHEWABLE at 08:48

## 2019-02-17 RX ADMIN — SODIUM CHLORIDE SCH MLS/HR: 900 INJECTION, SOLUTION INTRAVENOUS at 15:24

## 2019-02-17 RX ADMIN — LISINOPRIL SCH MG: 5 TABLET ORAL at 16:35

## 2019-02-17 NOTE — HISTORY & PHYSICAL-HOSPITALIST
History of Present Illness


HPI/Chief Complaint


Pt is a 53yoCM with a PMH of CAD who presented to the ER with CC of chest pain. 

He states it started a few days to a week ago but yesterday got worse. He 

developed SOB and radiation down his left arm as well. He thought it felt 

similar to his previous pain when he had a heart attack. His last cath was in 

2018 where he had a stent placed. He states he follows regularly 

with Dr Nation and was staken off his Plavix 2 weeks ago as it had been 1 year 

since his stent was placed. He otherwise only takes an aspirin.


Source:  patient


Date Seen


19


Time Seen by a Provider:  09:01


Attending Physician


Suhail Wilson MD


PCP


No,Local Physician


Referring Physician





Date of Admission


2019 at 14:33





Home Medications & Allergies


Home Medications


Reviewed patient Home Medication Reconciliation performed by pharmacy 

medication reconciliations technician and/or nursing.


Patients Allergies have been reviewed.





Allergies





Allergies


Uncoded Allergies


  bee sting ( Adverse Reaction, Intermediate, swelling, 13)








Past Medical-Social-Family Hx


Past Med/Social Hx:  Reviewed Nursing Past Med/Soc Hx


Patient Social History


Marrital Status:  single


Alcohol Use:  Denies Use


Recreational Drug Use:  No


Smoking Status:  Current Everyday Smoker


Type Used:  Cigarettes


2nd Hand Smoke Exposure:  No


Physical Abuse Screen:  No


Sexual Abuse:  No


Recent Foreign Travel:  No


Contact w/other who traveled:  No


Recent Hopitalizations:  No


Recent Infectious Disease Expo:  No





Immunizations Up To Date


Tetanus Booster (TDap):  Less than 5yrs





Seasonal Allergies


Seasonal Allergies:  No





Past Medical History


Surgeries:  Cardiac, Coronary Stent, Orthopedic


Currently Using CPAP:  No


Currently Using BIPAP:  No


Cardiac:  Coronary Artery Disease, Heart Attack, High Cholesterol


Reproductive:  No


Gastrointestinal:  Gastroesophageal Reflux


Are Your Blood Sugars Over 250:  No


History of Blood Disorders:  No


Adverse Reaction to Blood Colmenares:  No





Family History


Reviewed Nursing Family Hx





Cardiovascular disease


  19 FATHER


Myocardial infarction


  19 FATHER


No Pertinent Family Hx





Review of Systems


Constitutional:  No chills, No fever


EENTM:  No blurred vision, No double vision, No nose congestion, No throat pain


Respiratory:  No cough, No dyspnea on exertion; short of breath


Cardiovascular:  chest pain; No edema; Hx of Intervention; No palpitations, No 

syncope


Gastrointestinal:  No abdominal pain, No constipation, No diarrhea, No nausea, 

No vomiting


Genitourinary:  No dysuria, No frequency


Musculoskeletal:  No joint pain, No muscle pain


Skin:  No lesions, No rash


Psychiatric/Neurological:  Denies Headache, Denies Numbness, Denies Tingling





Physical Exam


Physical Exam


Vital Signs





Vital Signs - First Documented








 19





 13:34 13:45


 


Temp 98.0 


 


Pulse 77 


 


Resp 16 


 


B/P (MAP) 116/79 (91) 


 


Pulse Ox 100 


 


O2 Delivery Room Air 


 


O2 Flow Rate  2.00





Capillary Refill : Less Than 3 Seconds


Height, Weight, BMI


Height: 5'4.00"


Weight: 144lbs. 0.0oz. 65.802172so; 24.4 BMI


Method:Stated


General Appearance:  No Apparent Distress, WD/WN


Neck:  Normal Inspection; No Lymphadenopathy (L), No Lymphadenopathy (R)


Respiratory:  Lungs Clear, No Respiratory Distress


Cardiovascular:  Regular Rate, Rhythm, No JVD, No Murmur


Gastrointestinal:  Normal Bowel Sounds, Non Tender, Soft


Extremity:  No Calf Tenderness, No Pedal Edema


Neurologic/Psychiatric:  Alert, Oriented x3, Normal Mood/Affect





Results


Results/Procedures


Labs


Laboratory Tests


19 13:37








19 03:30








Patient resulted labs reviewed.


Imaging:  Reviewed Imaging Report





Assessment/Plan


Admission Diagnosis


NSTEMI


Admission Status:  Inpatient Order (span 2 midnights)


Reason for Inpatient Admission:  


needs cath





Diagnosis/Problems


Diagnosis/Problems





(1) NSTEMI (non-ST elevated myocardial infarction)


Assessment & Plan:  


Troponin elevated last night


Plan for cath today per Cardiology


Cardiology consulted, appreciate assistance


Continue ASA/Plavix


Patient has flat affect


    consult to assist with outpatient follow up and potentially 

MH screening for post MI depression





(2) Coronary artery disease


Status:  Acute


Assessment & Plan:  


History of multiple MIs and stents


COntinue ASA, Plavix


Start statin


Qualifiers:  


   Coronary Disease-Associated Artery/Lesion type:  native artery  Native vs. 

transplanted heart:  native heart  Associated angina:  angina presence 

unspecified  Qualified Codes:  I25.10 - Atherosclerotic heart disease of native 

coronary artery without angina pectoris


(3) Tobacco abuse


Assessment & Plan:  


Recommended smoking cessation


States he is trying to quit but still smoking 1ppd


Discussed cessation options including patch and gum


Declines any assistance at this time








Clinical Quality Measures


End of Life/Advance Care Plan:


Advance Care discuss with:  patient, family member (s)


Plan:  identified end-of-life goals (full code, does not have advanced directive

, would like 2 sisters and his girlfriend to be his decision makers if he is 

unable to make decisions, agreeable to filling out advance directive)





AMI/AHF:


ASA po Prior to arrival:  Yes





DVT/VTE Risk/Contraindication:


Risk Factor Score Per Nursin


RFS Level Per Nursing on Admit:  2=Moderate





Smoking Cessation Counseling:


Counseling-Symptomatic:  3-10 Minutes


Discussed Options Including:  Nicotine Patch, Nicotine Gum











SUHAIL WILSON MD 2019 09:08

## 2019-02-17 NOTE — CARDIAC CATHETERIZATION
DATE OF SERVICE:  02/17/2019



CARDIAC CATHETERIZATION AND CORONARY INTERVENTION REPORT



The patient is a 53-year-old man, patient of Dr. Nation, who was admitted with

acute non-ST elevation myocardial infarction and cardiac catheterization was

carried out today after having obtained an informed consent for cardiac

catheterization and possible ad hoc coronary intervention.



PROCEDURE:

He was brought to the cardiac catheterization laboratory.  Right groin was

prepared and draped in the usual sterile fashion.  Lidocaine 1% was used for

local anesthesia.  Modified Seldinger technique was used to advance a 5-Syrian

sheath in the right femoral artery, a 5-Syrian JL4 catheter for left coronary

angiography, a 5-Syrian JR4 catheter was used for right coronary angiography, a

5-Syrian pigtail catheter was used for left heart catheterization and left

ventricular angiography.  Subsequently, percutaneous intervention was carried

out to the left anterior descending artery and its diagonal branch, as described

below.



BALLOON ANGIOPLASTY OF THE LEFT ANTERIOR DESCENDING AND OF THE FIRST DIAGONAL

BRANCH OF THE LEFT ANTERIOR DESCENDING:

The sheath was exchanged over a wire for a 7-Syrian sheath.  We gave 5000 units

of intravenous heparin.  We gave a double bolus of Integrilin.  We used a

7-Syrian JL4 guide catheter to engage the left coronary artery.  We advanced a

choice floppy wire across the lesions in the ostial and proximal first diagonal

and the tip was placed in the distal part of this diagonal.  We advanced a BMW

wire across the mid left anterior descending artery lesion and the tip was

placed in the distal part of the left anterior descending.  The ostial diagonal

was exhibiting approximately 80% stenosis.  The mid left anterior descending,

just following the origin of the first diagonal branch was exhibiting 95%

stenosis.  We advanced an Emerge 2.0 x 20 mm balloons on the wires and these

were placed in a kissing fashion.  Kissing balloon angioplasty was carried out. 

The stenosis in the ostial part of the diagonal branch improved from 80% to

approximately 50%.  The stenosis in the mid left anterior descending artery

improved from 95% to approximately 80%.



STENTING OF THE LEFT ANTERIOR DESCENDING:

After balloon angioplasty in the left anterior descending and the first

diagonal, as described above, we removed the balloons and kept the wires in

position.  We advanced Alpine Xience 2.5 x 28 mm stent over the BMW wire into

the left anterior descending and this was carefully positioned to cover the

entire lesion in the proximal and mid left anterior descending.  The wire in the

diagonal branch was removed.  The stent was deployed at 14 atmospheres.  The

stent balloon was then collapsed and pulled back, slightly proximally and the

balloon was reinflated within the proximal and mid portion of the stent to 20

atmospheres.  This was done because the proximal portion of the stented area is

of a slightly larger caliber than the distal portion of the stented area.  The

stent balloon was then collapsed and removed.  Angiography indicated 0% residual

stenosis in the mid left anterior descending artery and normal antegrade flow. 

The first diagonal branch of the left anterior descending artery is jailed by

the stent.  There is good flow in the first diagonal.  Following balloon

angioplasty, there is fissuring of the plaque in the ostial portion of the first

diagonal branch, but there is no propagation of dissection.  This fissuring

appears to be the expected result of balloon angioplasty and is expected to heal

with time.  We did not take further intervention to the ostial portion of the

first diagonal that is jailed by the stent in the left anterior descending.  The

patient tolerated the procedure well.  Angioplasty equipment was removed. 

Angiography of the right femoral artery had been carried out through the sheath

at the beginning of the procedure.  At the end of the procedure, Mynx was used

to achieve hemostasis following sheath removal.



HEMODYNAMICS:

Left ventricular end-diastolic pressure following coronary angiography was 10

mmHg.  There was no significant pressure gradient on pullback across the aortic

valve.  Ascending aortic pressure was 88/54 with a mean of 69 mmHg.



LEFT VENTRICULAR ANGIOGRAPHY:

Left ventricular angiography was carried out in the right anterior oblique

projection.  There appears to be mild global hypokinesis of the left ventricle. 

Left ventricular ejection fraction is estimated to be 40% to 45%.  There does

not appear to be significant mitral regurgitation.



CORONARY ANGIOGRAPHY:

Diffuse coronary calcification is seen.  Left main coronary artery does not

exhibit significant disease.  Left anterior descending artery had 95% mid vessel

stenosis in the left anterior descending and 80% ostial stenosis in the first

diagonal (bifurcation lesion).  These were treated as detailed above.  Following

balloon angioplasty in the first diagonal, there is approximately 50% residual

stenosis and fissuring of the plaque without any propagation of dissection. 

Following deployment of Alpine Xience 2.5 x 28 mm stent in the proximal and mid

left anterior descending, there is 0% residual stenosis.  The distal left

anterior descending artery has diffuse moderate disease.  The left circumflex

artery has a patent stent in its mid portion and there is 60% to 70% stenosis

distal to the distal edge of the stent.  An obtuse marginal branch arises just

proximal to the proximal edge of the stent and this exhibit 60% to 70% ostial

stenosis.  The right coronary artery has diffuse moderate disease.  There is a

patent stent in its distal portion that seems to extend across the posterior

descending branch of the right coronary artery.  The stent is patent and there

appears to be 70% ostial stenosis of the posterior descending branch of the

right coronary artery.



CONCLUSIONS:

1.  A 95% mid vessel stenosis of the left anterior descending artery treated

with Alpine Xience 2.5 x 28 mm stent with no residual stenosis.  This jailed the

first diagonal branch.  The first diagonal branch had 80% stenosis and was

treated with balloon angioplasty that reduced to approximately 50% with

fissuring of plaque without any propagation of dissection.  There is a patent

old stent in the mid left circumflex with 60% to 70% distal stenosis and with

60% to 70% ostial stenosis of the first obtuse marginal that arises just

proximal to the stent.  There is a patent old stent (known to be Alpine Xience

2.5 x 15 mm) in the distal right coronary artery that jails the posterior

descending and the posterior descending has 60% to 70% ostial stenosis.  There

are multiple other 40% to 50% stenosis in all coronary arteries.

2.  Mild global hypokinesis of left ventricle with left ventricular ejection

fraction 40% to 45%.

3.  Normal left ventricular end-diastolic pressure (10 mmHg).



DISCUSSION AND RECOMMENDATIONS:

Dual antiplatelet therapy is being continued.  He has a chronically low blood

pressure, but we are trying to add very low-dose beta-blockers and ACE

inhibitors and these will be continued, if tolerated.  Continuing therapy is

being provided for hyperlipidemia with atorvastatin.  The patient has been

advised to quit smoking immediately and completely.  I discussed the cardiac

catheterization findings and interventions undertaken and their results, with

the patient and his family in detail.





Job ID: [id number]

DocumentID: [id number]

Dictated Date:  02/17/2019 15:11:10

Transcription Date: 02/17/2019 19:45:53

Dictated By: LES AVILES MD, MA, FACP, FACC,

MTDD

## 2019-02-17 NOTE — PROGRESS NOTE-CARDIOLOGY
Cardiology SOAP Progress Note


Subjective:


No cp  this am, but did have some recurrent chest discomfort last evening. 

Underwent cor intervention this am. No symptoms since


Has chronic exertional shortness of breath


No palp or syncope





Objective:


I&O/Vital Signs











 2/17/19 2/17/19 2/17/19 2/17/19





 01:00 04:00 04:00 04:00


 


Temp  98.8  


 


Pulse 48  56 


 


Resp   11 


 


B/P (MAP)   104/67 (79) 


 


Pulse Ox   98 98


 


O2 Delivery   Room Air Room Air


 


    





 2/17/19 2/17/19 2/17/19 2/17/19





 07:00 07:00 07:28 07:46


 


Temp    98.6


 


Pulse 51 51  


 


Resp  16  


 


B/P (MAP)  92/63 (73)  


 


Pulse Ox  99  


 


O2 Delivery  Room Air Room Air 


 


    





 2/17/19 2/17/19  





 08:00 08:03  


 


Pulse 53   


 


Resp 15   


 


B/P (MAP) 110/78 (89)   


 


Pulse Ox 97   


 


O2 Delivery Room Air Room Air  














 2/17/19





 00:00


 


Intake Total 480 ml


 


Output Total 950 ml


 


Balance -470 ml








Weight (Pounds):  144


Weight (Ounces):  0.0


Weight (Calculated Kilograms):  65.934767


Constitutional:  AAO x 3, well-developed, well-nourished, other (Thin-appearing)


Respiratory:  No accessory muscle use; other (good bilat air entry, prolonged 

exp)


Cardiovascular:  regular rate-rhythm, S1 and S2, systolic murmur (soft LIZZY at 

card base)


Gastrointestional:  No tender; soft; No guarding, No rebound; audible bowel 

sounds


Extremities:  No clubbing, No cyanosis, No significant edema


Neurologic/Psychiatric:  oriented x 3, grossly intact, power is 5/5 both on 

sides


Skin:  No rash on exposed areas, No ulcerations on exposed areas





Results/Procedures:


Labs


Laboratory Tests


2/16/19 13:37: 


White Blood Count 12.9H, Red Blood Count 4.79, Hemoglobin 14.9, Hematocrit 42, 

Mean Corpuscular Volume 89, Mean Corpuscular Hemoglobin 31, Mean Corpuscular 

Hemoglobin Concent 35, Red Cell Distribution Width 13.7, Platelet Count 264, 

Mean Platelet Volume 9.7, Neutrophils (%) (Auto) 75, Lymphocytes (%) (Auto) 20, 

Monocytes (%) (Auto) 5, Eosinophils (%) (Auto) 0, Basophils (%) (Auto) 0, 

Neutrophils # (Auto) 9.6H, Lymphocytes # (Auto) 2.6, Monocytes # (Auto) 0.6, 

Eosinophils # (Auto) 0.1, Basophils # (Auto) 0.0, Prothrombin Time 12.8, INR 

Comment 1.0, Activated Partial Thromboplast Time 35, Sodium Level 135, 

Potassium Level 4.3, Chloride Level 101, Carbon Dioxide Level 23, Anion Gap 11, 

Blood Urea Nitrogen 10, Creatinine 1.04, Estimat Glomerular Filtration Rate > 60

, BUN/Creatinine Ratio 10, Glucose Level 149H, Calcium Level 9.1, Corrected 

Calcium 8.9, Magnesium Level 2.0, Total Bilirubin 0.2, Aspartate Amino Transf (

AST/SGOT) 23, Alanine Aminotransferase (ALT/SGPT) 23, Alkaline Phosphatase 74, 

Myoglobin 36.3, Troponin I 0.150, Total Protein 6.9, Albumin 4.2


2/16/19 17:40: Troponin I 1.164*H


2/17/19 03:30: 


White Blood Count 9.4, Red Blood Count 4.81, Hemoglobin 14.8, Hematocrit 43, 

Mean Corpuscular Volume 89, Mean Corpuscular Hemoglobin 31, Mean Corpuscular 

Hemoglobin Concent 34, Red Cell Distribution Width 13.9, Platelet Count 249, 

Mean Platelet Volume 9.6, Neutrophils (%) (Auto) 54, Lymphocytes (%) (Auto) 36, 

Monocytes (%) (Auto) 8, Eosinophils (%) (Auto) 2, Basophils (%) (Auto) 1, 

Neutrophils # (Auto) 5.1, Lymphocytes # (Auto) 3.4, Monocytes # (Auto) 0.8, 

Eosinophils # (Auto) 0.2, Basophils # (Auto) 0.1, Sodium Level 137, Potassium 

Level 4.4, Chloride Level 103, Carbon Dioxide Level 23, Anion Gap 11, Blood 

Urea Nitrogen 12, Creatinine 1.10, Estimat Glomerular Filtration Rate > 60, BUN/

Creatinine Ratio 11, Glucose Level 119H, Calcium Level 9.1, Magnesium Level 2.3

, Triglycerides Level 105, Cholesterol Level 139, LDL Cholesterol Direct 107, 

VLDL Cholesterol 21, HDL Cholesterol 26L, Thyroid Stimulating Hormone (TSH) 3.18








A/P:


Assessment:





Ac NSTEMI on 2/16/19





CAD. Card cath of 2/17/19 showed 95% mid LAD stenosis that was treated with 

Alpine 2.5 x 28 stent with no residual stenosis, jails D1; 80% 1st Diagonal 

ostial stenosis treated with balloon angioplasty that reduced it to 

approximately 50% with fissuring of plaque without any propagation of dissection

; patent old stent in mid LCX with 60-70% distal stenosis and with 60-70% 

ostial stenosis of OM1 that arises just proximal to the stent; patent old stent 

(Alp 2.5 x15) in distal RCA that jails PDA and PDA has 60-70% ostial stenosis; 

multiple other 40-50 % stenoses in all cors; mild global hypokinesis of LV; 

LVEF 40-45%, LVEDP 10 mmHg





Chronic systolic CHF. LVEF 40-45% on cath of 2/17/19





H/o relative intolerance to beta blocker, ACE inhibitor, or ARB, apparently due 

to chronically low bp that worsens with such agents





Hyperlipidemia, treated with atorvastatin that is followed by Dr Nation





Chronic tobacco use (smoking)


Plan:





* We undertook cath and PCI today that is summarized above and described in 

detail in the cath report


* Treat with dual antiplatelet therapy


* Attempt very low dose beta-blocker and ACE-inhibitor


* Monitor labs


* I discussed in detail with him his cath findings and interventions undertaken 

and their results


* Advised to quit smoking immediately and completely





Clinical Quality Measures


AMI/AHF:


ASA po Prior to arrival:  Yes





Smoking Cessation Counseling:


Counseling-Symptomatic:  3-10 Minutes


Discussed Options Including:  Nicotine Patch, Nicotine Gum











LES AVILES MD FACP FAC CCDS Feb 17, 2019 12:44

## 2019-02-18 VITALS — DIASTOLIC BLOOD PRESSURE: 57 MMHG | SYSTOLIC BLOOD PRESSURE: 94 MMHG

## 2019-02-18 VITALS — SYSTOLIC BLOOD PRESSURE: 100 MMHG | DIASTOLIC BLOOD PRESSURE: 64 MMHG

## 2019-02-18 VITALS — SYSTOLIC BLOOD PRESSURE: 102 MMHG | DIASTOLIC BLOOD PRESSURE: 60 MMHG

## 2019-02-18 VITALS — DIASTOLIC BLOOD PRESSURE: 72 MMHG | SYSTOLIC BLOOD PRESSURE: 93 MMHG

## 2019-02-18 LAB
BUN/CREAT SERPL: 16
CALCIUM SERPL-MCNC: 8.3 MG/DL (ref 8.5–10.1)
CHLORIDE SERPL-SCNC: 109 MMOL/L (ref 98–107)
CO2 SERPL-SCNC: 22 MMOL/L (ref 21–32)
CREAT SERPL-MCNC: 1.02 MG/DL (ref 0.6–1.3)
ERYTHROCYTE [DISTWIDTH] IN BLOOD BY AUTOMATED COUNT: 13.6 % (ref 10–14.5)
GFR SERPLBLD BASED ON 1.73 SQ M-ARVRAT: > 60 ML/MIN
GLUCOSE SERPL-MCNC: 124 MG/DL (ref 70–105)
HCT VFR BLD CALC: 39 % (ref 40–54)
HGB BLD-MCNC: 13.1 G/DL (ref 13.3–17.7)
MCH RBC QN AUTO: 30 PG (ref 25–34)
MCHC RBC AUTO-ENTMCNC: 33 G/DL (ref 32–36)
MCV RBC AUTO: 90 FL (ref 80–99)
PLATELET # BLD: 195 10^3/UL (ref 130–400)
PMV BLD AUTO: 9.5 FL (ref 7.4–10.4)
POTASSIUM SERPL-SCNC: 4.2 MMOL/L (ref 3.6–5)
SODIUM SERPL-SCNC: 137 MMOL/L (ref 135–145)
WBC # BLD AUTO: 8.8 10^3/UL (ref 4.3–11)

## 2019-02-18 RX ADMIN — LISINOPRIL SCH MG: 5 TABLET ORAL at 08:09

## 2019-02-18 RX ADMIN — CLOPIDOGREL BISULFATE SCH MG: 75 TABLET, FILM COATED ORAL at 08:09

## 2019-02-18 RX ADMIN — ASPIRIN 81 MG CHEWABLE TABLET SCH MG: 81 TABLET CHEWABLE at 08:10

## 2019-02-18 RX ADMIN — ENOXAPARIN SODIUM SCH MG: 100 INJECTION SUBCUTANEOUS at 08:09

## 2019-02-18 NOTE — NUR
CM/SS responded to consult for SS. Patient had already been discharged from the hospital. He 
stated that he was feeling fine and did not feel a need for MH services. Discussed services 
available if he had a need for them.

## 2019-02-18 NOTE — CARDIOLOGY PROGRESS NOTE
Subjective


Date Seen by Provider:  2019


Time Seen by Provider:  09:27


Subjective/Events-last exam


Patient is laying down in bed, feeling better, denied any active chest pain.  

No palpitation.  Had multiple questions about his condition, concerned about 

the risk of heart attack after stopping Plavix.  I discussed with him in length 

and answered all his questions, recommended again to stop smoking


Review of Systems


General:  No Chills, No Night Sweats, No Fatigue, No Malaise, No Appetite, No 

Other


HEENT:  No Head Aches, No Visual Changes, No Eye Pain, No Ear Pain, No Dysphasia

, No Sinus Congestion, No Post Nasal Drip, No Sore Throat, No Other


Pulmonary:  No Dyspnea, No Cough, No Pleuritic Chest Pain, No Other


Cardiovascular:  No: Chest Pain, Palpitations, Orthopnea, Paroxysmal Noc. 

Dyspnea, Edema, Lt Headedness, Other





Objective-Cardiology


Exam


Last Set of Vital Signs





Vital Signs








 19





 13:45 07:50 08:11


 


Temp  98.2 


 


Pulse  65 


 


Resp  18 


 


B/P (MAP)  100/64 (76) 


 


Pulse Ox  95 


 


O2 Delivery   Room Air


 


O2 Flow Rate 2.00  





Capillary Refill : Less Than 3 Seconds


I&O











Intake and Output 


 


 19





 00:00


 


Intake Total 1470 ml


 


Output Total 1575 ml


 


Balance -105 ml


 


 


 


Intake Oral 470 ml


 


IV Total 1000 ml


 


Output Urine Total 1575 ml








General:  Alert, Oriented X3, Cooperative


HEENT:  Atraumatic, PERRLA


Neck:  Supple, No JVD, No Thyromegaly


Lungs:  Clear to Auscultation, Normal Air Movement


Heart:  Regular Rate, Normal S1, Normal S2, No Murmurs


Abdomen:  Normal Bowel Sounds, Soft, No Tenderness, No Hepatosplenomegaly, No 

Masses


Extremities:  No Clubbing, No Cyanosis, No Edema, Normal Pulses, No Tenderness/

Swelling


Skin:  No Rashes, No Breakdown, No Significant Lesion


Neuro:  Normal Gait, Normal Speech, Strength at 5/5 X4 Ext, Normal Tone, 

Sensation Intact


Psych/Mental Status:  Mental Status NL, Mood NL





Results


Lab


Laboratory Tests


19 03:30














A/P-Cardiology


Admission Diagnosis


Non-ST elevation myocardial infarction


Coronary artery disease


Hyperlipidemia


Tobaccoism





Assessment/Plan


Non-ST elevation myocardial infarction, status post stenting to the LAD with 

balloon angioplasty to the diagonal artery done by Dr. Fuller





Chest pain, reporting improvement at this time.  Continue to monitor





Coronary artery disease, most recent cardiac catheterization 2013 

demonstrated 80 percent stenosis at the ostium of the obtuse marginal branch 

that had struts from previous stent was placed in the circumflex artery in 

2013.  Possibility of threading wire through struts which can create 

significant complications is high with conservative management recommended.  No 

intervention was done at the time.  Had acute MI on 2018 with 

subtotal occlusion in the RCA, underwent emergency angioplasty with alpine 2.5 

x 15 mm stent placed to the RCA with good results.  Proximal to the stent there 

is an area of 40-50 percent which will be treated conservatively.  cardiac 

catheterization was done again on 2019 by Dr. Fuller after having 

non-ST elevation myocardial infarction had Alpine stent 2.528 mm in the LAD, 

jailed the first diagonal branch, balloon angioplasty was done to the diagonal 

branch and had some small dissection with residual 50 percent stenosis, treated 

conservatively.  Patient was concerned about stopping the blood thinner, all 

his questions were answered, he will need to be on dual antiplatelet therapy 

for one year at least





History of congestive heart failure with EF 45 percent- He is unable to 

tolerate beta blocker, ACE inhibitor, or ARB secondary to hypotension, continue 

to monitor





Hypotension-blood pressure currently stable.  Unable to tolerate blood pressure 

medication secondary to hypertension.





Hyperlipidemia, restart statin and monitor





Tobaccoism-patient once again educated on importance of smoking cessation





Clinical Quality Measures


AMI/AHF:


ASA po Prior to arrival:  Yes





DVT/VTE Risk/Contraindication:


Risk Factor Score Per Nursin


RFS Level Per Nursing on Admit:  2=Moderate





Smoking Cessation Counseling:


Counseling-Symptomatic:  3-10 Minutes


Discussed Options Including:  Nicotine Patch, Nicotine Gum











JACY BYNUM MD 2019 09:28

## 2019-02-18 NOTE — DISCHARGE INST-POST CATH
Discharge Inst-CATH/EP


Post Cardiac Cath/EP D/C Inst


Follow Up/Plan


Appointment with Dr. Nation's office next week


CARDIAC CATH DISCHARGE INSTRUCTIONS





*Hold Metformin for 48 hours post heart cath.





ACTIVITY





* Go Home directly and rest.


* Limit activity of the leg (or wrist if it was used) for 7 days including 

aerobics, swimming,


   jogging, bicycling, etc.


* Restrict stair-climbing for 7 days if possible, if not, climb up with your non

-cath leg, then


   bring together on the same step.


* Avoid lifting, pushing, pulling or excessive movement of the affected 

extremity for 7 days.


* Customary sexual activity may be resumed after 2 days-use caution not to use 

a position  


   that strains or causes pain to the affected extremity.


* No driving for 24 hours.


* NO SMOKING. 


* Avoid straining for bowel movements for 7 days.


* Gentle walking on level ground is allowed.


* Returning to work will depend on the type of procedure and the results. Your 

doctor will discuss


   this with you.





CALL YOUR DOCTOR FOR ANY OF THE FOLLOWING:





*If bleeding from the puncture site occurs- Apply gentle pressure to site with 

clean cloth and call


   your doctor or EMS.


* If a knot or lump forms under the skin, increases in size, or causes pain.


* If bruising appears to be worsening or moving further down your leg instead 

of disappearing.


* Temperature above 101 F.





CARE OF YOUR GROIN INCISION;





* Bruising or purple discoloration of the skin near the puncture site is common.


* You may shower only, no bathtub bathing for 5 days.  Be careful to avoid 

slipping as your


   leg may feel stiff.


* If a closure device was used on your femoral artery, please see the attached 

guide regarding


   care of the device and your leg.


* Leave the dressing on, until removed by office staff.





CARE OF YOUR WRIST INCISION;





* Bruising or purple discoloration of the skin near the puncture site is common.


* You may shower.


* DO NOT submerge wrist.


* Leave dressing on, until removed by office staff..











JACY NATION MD Feb 18, 2019 09:31

## 2019-02-18 NOTE — DISCHARGE SUMMARY-HOSPITALIST
TONIE RIVERA DO 19 0957:


Diagnosis/Chief Complaint


Date of Admission


2019 at 13:27


Date of Discharge





Discharge Date:  2019


Admission Diagnosis


NSTEMI


Discharge Diagnosis





(1) NSTEMI (non-ST elevated myocardial infarction)


Status:  Acute


Assessment & Plan:  


Troponin elevated last night


Plan for cath today per Cardiology


Cardiology consulted, appreciate assistance


Continue ASA/Plavix


Patient has flat affect


    consult to assist with outpatient follow up and potentially 

MH screening for post MI depression





(2) Coronary artery disease


Status:  Acute


Assessment & Plan:  


History of multiple MIs and stents


COntinue ASA, Plavix


Start statin





(3) Tobacco abuse


Status:  Chronic


Assessment & Plan:  


Recommended smoking cessation


States he is trying to quit but still smoking 1ppd


Discussed cessation options including patch and gum


Declines any assistance at this time








Discharge Summary


Discharge Physical Exam


Allergies:  


Uncoded Allergies:  


     bee sting (Adverse Reaction, Intermediate, swelling, 13)


Vitals & I&Os





Vital Signs








  Date Time  Temp Pulse Resp B/P (MAP) Pulse Ox O2 Delivery O2 Flow Rate FiO2


 


19 09:00  66 16 93/72 (79)  Room Air  


 


19 07:50 98.2    95   


 


19 13:45       2.00 








General Appearance:  No Apparent Distress, WD/WN


HEENT:  PERRL/EOMI, Normal ENT Inspection


Respiratory:  Lungs Clear, No Respiratory Distress


Cardiovascular:  Regular Rate, Rhythm, No JVD, No Murmur


Gastrointestinal:  Normal Bowel Sounds, Non Tender, Soft


Extremity:  No Calf Tenderness, No Pedal Edema


Skin:  Normal Color, Warm/Dry


Neurologic/Psychiatric:  Alert, Oriented x3, Normal Mood/Affect





Hospital Course


Was the Problem List Reviewed?:  Yes


Pt doing well 


Tolerating new medications since stent was placed 


Smoking sensation counceled 


Uses [facility] as a pharmacy 


Cardiology is discharging today 


Labs (last 24 hrs)


Patient resulted labs reviewed.


Pending Labs





Imaging:  Reviewed Imaging Report





Discussion & Recommendations


Discharge Planning:  <30 minutes discharge planning





Discharge


Home Medications:





Active Scripts


Active


Nicoderm Cq (Nicotine) 1 Each Patch.td24 1 Each TD DAILY


Lipitor (Atorvastatin Calcium) 40 Mg Tablet 40 Mg PO HS


Clopidogrel (Clopidogrel Bisulfate) 75 Mg Tablet 75 Mg PO DAILY


Reported


Fish Oil 1,000 mg Capsule (Omega 3 Polyunsat Fatty Acids) 1,000 Mg Cap 2,000 Mg 

PO DAILY


Aspirin EC (Aspirin) 81 Mg Tablet. 81 Mg PO DAILY





Instructions to patient/family


Please see electronic discharge instructions given to patient.





Clinical Quality Measures


AMI/AHF:


ASA po Prior to arrival:  Yes





DVT/VTE Risk/Contraindication:


Risk Factor Score Per Nursin


RFS Level Per Nursing on Admit:  2=Moderate





Smoking Cessation Counseling:


Counseling-Symptomatic:  3-10 Minutes


Discussed Options Including:  Nicotine Patch, Nicotine Gum





SHAWN BADILLO STUDENT 19 1105:


Discharge Summary


Discharge Physical Exam


Allergies:  


Uncoded Allergies:  


     bee sting (Adverse Reaction, Intermediate, swelling, 13)


General Appearance:  No Apparent Distress, WD/WN


HEENT:  Normal ENT Inspection


Respiratory:  Lungs Clear, No Respiratory Distress


Cardiovascular:  Regular Rate, Rhythm, No JVD


Gastrointestinal:  Normal Bowel Sounds, Non Tender, Soft


Extremity:  No Calf Tenderness


Skin:  Normal Color, Warm/Dry


Neurologic/Psychiatric:  Alert, Oriented x3, Normal Mood/Affect





Hospital Course


This patient was admitted yesterday with chest pain and a history of CAD. He 

was found to have elevated troponins and was subsequently taken to cath lab for 

evaluation. A stent was placed in LAD artery. He was monitored overnight in the 

ICU with no more cardiac events. He is awake and feeling well this morning, and 

states he would like to go home. Once cleared by cardiology, he will be 

discharged to home. Recommend close cardiac follow up.





Problem Qualifiers





(1) Coronary artery disease:  


Coronary Disease-Associated Artery/Lesion type:  native artery  Native vs. 

transplanted heart:  native heart  Associated angina:  angina presence 

unspecified  Qualified Codes:  I25.10 - Atherosclerotic heart disease of native 

coronary artery without angina pectoris








TONIE RIVERA DO 2019 09:57


SHAWN BADILLO MED STUDENT 2019 11:05

## 2019-09-06 ENCOUNTER — HOSPITAL ENCOUNTER (EMERGENCY)
Dept: HOSPITAL 75 - ER | Age: 54
Discharge: HOME | End: 2019-09-06
Payer: SELF-PAY

## 2019-09-06 VITALS — DIASTOLIC BLOOD PRESSURE: 88 MMHG | SYSTOLIC BLOOD PRESSURE: 125 MMHG

## 2019-09-06 VITALS — WEIGHT: 145 LBS | HEIGHT: 67 IN | BODY MASS INDEX: 22.76 KG/M2

## 2019-09-06 DIAGNOSIS — J40: Primary | ICD-10-CM

## 2019-09-06 DIAGNOSIS — Z91.030: ICD-10-CM

## 2019-09-06 DIAGNOSIS — Z79.82: ICD-10-CM

## 2019-09-06 DIAGNOSIS — K21.9: ICD-10-CM

## 2019-09-06 DIAGNOSIS — F17.210: ICD-10-CM

## 2019-09-06 DIAGNOSIS — Z82.49: ICD-10-CM

## 2019-09-06 DIAGNOSIS — E78.00: ICD-10-CM

## 2019-09-06 DIAGNOSIS — I25.2: ICD-10-CM

## 2019-09-06 DIAGNOSIS — Z87.01: ICD-10-CM

## 2019-09-06 DIAGNOSIS — E11.9: ICD-10-CM

## 2019-09-06 DIAGNOSIS — Z79.02: ICD-10-CM

## 2019-09-06 DIAGNOSIS — Z95.5: ICD-10-CM

## 2019-09-06 DIAGNOSIS — I25.10: ICD-10-CM

## 2019-09-06 LAB
ALBUMIN SERPL-MCNC: 4.1 GM/DL (ref 3.2–4.5)
ALP SERPL-CCNC: 85 U/L (ref 40–136)
ALT SERPL-CCNC: 17 U/L (ref 0–55)
APTT BLD: 35 SEC (ref 24–35)
BASOPHILS # BLD AUTO: 0 10^3/UL (ref 0–0.1)
BASOPHILS NFR BLD AUTO: 0 % (ref 0–10)
BILIRUB SERPL-MCNC: 0.4 MG/DL (ref 0.1–1)
BUN/CREAT SERPL: 11
CALCIUM SERPL-MCNC: 9.3 MG/DL (ref 8.5–10.1)
CHLORIDE SERPL-SCNC: 106 MMOL/L (ref 98–107)
CO2 SERPL-SCNC: 21 MMOL/L (ref 21–32)
CREAT SERPL-MCNC: 0.98 MG/DL (ref 0.6–1.3)
EOSINOPHIL # BLD AUTO: 0.2 10^3/UL (ref 0–0.3)
EOSINOPHIL NFR BLD AUTO: 2 % (ref 0–10)
ERYTHROCYTE [DISTWIDTH] IN BLOOD BY AUTOMATED COUNT: 13.6 % (ref 10–14.5)
GFR SERPLBLD BASED ON 1.73 SQ M-ARVRAT: > 60 ML/MIN
GLUCOSE SERPL-MCNC: 125 MG/DL (ref 70–105)
HCT VFR BLD CALC: 44 % (ref 40–54)
HGB BLD-MCNC: 15.6 G/DL (ref 13.3–17.7)
INR PPP: 1 (ref 0.8–1.4)
LYMPHOCYTES # BLD AUTO: 2.8 X 10^3 (ref 1–4)
LYMPHOCYTES NFR BLD AUTO: 27 % (ref 12–44)
MANUAL DIFFERENTIAL PERFORMED BLD QL: NO
MCH RBC QN AUTO: 31 PG (ref 25–34)
MCHC RBC AUTO-ENTMCNC: 35 G/DL (ref 32–36)
MCV RBC AUTO: 88 FL (ref 80–99)
MONOCYTES # BLD AUTO: 0.8 X 10^3 (ref 0–1)
MONOCYTES NFR BLD AUTO: 8 % (ref 0–12)
NEUTROPHILS # BLD AUTO: 6.4 X 10^3 (ref 1.8–7.8)
NEUTROPHILS NFR BLD AUTO: 63 % (ref 42–75)
PLATELET # BLD: 198 10^3/UL (ref 130–400)
PMV BLD AUTO: 10 FL (ref 7.4–10.4)
POTASSIUM SERPL-SCNC: 4.3 MMOL/L (ref 3.6–5)
PROT SERPL-MCNC: 6.9 GM/DL (ref 6.4–8.2)
PROTHROMBIN TIME: 13.2 SEC (ref 12.2–14.7)
SODIUM SERPL-SCNC: 138 MMOL/L (ref 135–145)
WBC # BLD AUTO: 10.2 10^3/UL (ref 4.3–11)

## 2019-09-06 PROCEDURE — 71046 X-RAY EXAM CHEST 2 VIEWS: CPT

## 2019-09-06 PROCEDURE — 83605 ASSAY OF LACTIC ACID: CPT

## 2019-09-06 PROCEDURE — 87040 BLOOD CULTURE FOR BACTERIA: CPT

## 2019-09-06 PROCEDURE — 80053 COMPREHEN METABOLIC PANEL: CPT

## 2019-09-06 PROCEDURE — 85730 THROMBOPLASTIN TIME PARTIAL: CPT

## 2019-09-06 PROCEDURE — 85610 PROTHROMBIN TIME: CPT

## 2019-09-06 PROCEDURE — 85025 COMPLETE CBC W/AUTO DIFF WBC: CPT

## 2019-09-06 PROCEDURE — 96374 THER/PROPH/DIAG INJ IV PUSH: CPT

## 2019-09-06 PROCEDURE — 96361 HYDRATE IV INFUSION ADD-ON: CPT

## 2019-09-06 PROCEDURE — 96375 TX/PRO/DX INJ NEW DRUG ADDON: CPT

## 2019-09-06 PROCEDURE — 87804 INFLUENZA ASSAY W/OPTIC: CPT

## 2019-09-06 PROCEDURE — 36415 COLL VENOUS BLD VENIPUNCTURE: CPT

## 2019-09-06 NOTE — DIAGNOSTIC IMAGING REPORT
INDICATION: Cough, congestion, fever. 



COMPARISON: Study compared, 2/16/2019. 



EXAMINATION: Two views of the chest were obtained.



FINDINGS: The lungs are clear. The heart size and vascularity are

normal. There is no failure, effusion or pneumothorax. No

pneumonia. 



IMPRESSION: Negative. 



Dictated by: 



  Dictated on workstation # [id number]
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
## 2021-06-18 ENCOUNTER — HOSPITAL ENCOUNTER (OUTPATIENT)
Dept: HOSPITAL 75 - CARD | Age: 56
End: 2021-06-18
Attending: INTERNAL MEDICINE
Payer: SELF-PAY

## 2021-06-18 DIAGNOSIS — I10: Primary | ICD-10-CM

## 2021-06-18 DIAGNOSIS — R07.9: ICD-10-CM

## 2021-06-18 PROCEDURE — 93306 TTE W/DOPPLER COMPLETE: CPT

## 2022-07-03 ENCOUNTER — HOSPITAL ENCOUNTER (EMERGENCY)
Dept: HOSPITAL 75 - ER | Age: 57
Discharge: HOME | End: 2022-07-03
Payer: SELF-PAY

## 2022-07-03 VITALS — HEIGHT: 67.24 IN | WEIGHT: 144.84 LBS | BODY MASS INDEX: 22.47 KG/M2

## 2022-07-03 VITALS — SYSTOLIC BLOOD PRESSURE: 104 MMHG | DIASTOLIC BLOOD PRESSURE: 71 MMHG

## 2022-07-03 DIAGNOSIS — Z87.891: ICD-10-CM

## 2022-07-03 DIAGNOSIS — Z20.822: ICD-10-CM

## 2022-07-03 DIAGNOSIS — E86.0: ICD-10-CM

## 2022-07-03 DIAGNOSIS — B34.9: Primary | ICD-10-CM

## 2022-07-03 LAB
ALBUMIN SERPL-MCNC: 4 GM/DL (ref 3.2–4.5)
ALP SERPL-CCNC: 85 U/L (ref 40–136)
ALT SERPL-CCNC: 46 U/L (ref 0–55)
BASOPHILS # BLD AUTO: 0 10^3/UL (ref 0–0.1)
BASOPHILS NFR BLD AUTO: 1 % (ref 0–10)
BILIRUB SERPL-MCNC: 0.8 MG/DL (ref 0.1–1)
BUN/CREAT SERPL: 21
CALCIUM SERPL-MCNC: 9.1 MG/DL (ref 8.5–10.1)
CHLORIDE SERPL-SCNC: 101 MMOL/L (ref 98–107)
CO2 SERPL-SCNC: 20 MMOL/L (ref 21–32)
CREAT SERPL-MCNC: 1.21 MG/DL (ref 0.6–1.3)
EOSINOPHIL # BLD AUTO: 0 10^3/UL (ref 0–0.3)
EOSINOPHIL NFR BLD AUTO: 0 % (ref 0–10)
GFR SERPLBLD BASED ON 1.73 SQ M-ARVRAT: 70 ML/MIN
GLUCOSE SERPL-MCNC: 124 MG/DL (ref 70–105)
HCT VFR BLD CALC: 45 % (ref 40–54)
HGB BLD-MCNC: 15.6 G/DL (ref 13.3–17.7)
LYMPHOCYTES # BLD AUTO: 1.7 10^3/UL (ref 1–4)
LYMPHOCYTES NFR BLD AUTO: 35 % (ref 12–44)
MANUAL DIFFERENTIAL PERFORMED BLD QL: NO
MCH RBC QN AUTO: 31 PG (ref 25–34)
MCHC RBC AUTO-ENTMCNC: 34 G/DL (ref 32–36)
MCV RBC AUTO: 90 FL (ref 80–99)
MONOCYTES # BLD AUTO: 0.6 10^3/UL (ref 0–1)
MONOCYTES NFR BLD AUTO: 11 % (ref 0–12)
NEUTROPHILS # BLD AUTO: 2.6 10^3/UL (ref 1.8–7.8)
NEUTROPHILS NFR BLD AUTO: 52 % (ref 42–75)
PLATELET # BLD: 83 10^3/UL (ref 130–400)
PMV BLD AUTO: 11.7 FL (ref 9–12.2)
POTASSIUM SERPL-SCNC: 4.8 MMOL/L (ref 3.6–5)
PROT SERPL-MCNC: 6.9 GM/DL (ref 6.4–8.2)
SODIUM SERPL-SCNC: 135 MMOL/L (ref 135–145)
WBC # BLD AUTO: 4.9 10^3/UL (ref 4.3–11)

## 2022-07-03 PROCEDURE — 36415 COLL VENOUS BLD VENIPUNCTURE: CPT

## 2022-07-03 PROCEDURE — 87636 SARSCOV2 & INF A&B AMP PRB: CPT

## 2022-07-03 PROCEDURE — 86666 EHRLICHIA ANTIBODY: CPT

## 2022-07-03 PROCEDURE — 86757 RICKETTSIA ANTIBODY: CPT

## 2022-07-03 PROCEDURE — 85025 COMPLETE CBC W/AUTO DIFF WBC: CPT

## 2022-07-03 PROCEDURE — 86618 LYME DISEASE ANTIBODY: CPT

## 2022-07-03 PROCEDURE — 80053 COMPREHEN METABOLIC PANEL: CPT

## 2022-07-03 PROCEDURE — 71045 X-RAY EXAM CHEST 1 VIEW: CPT

## 2022-07-03 PROCEDURE — 96360 HYDRATION IV INFUSION INIT: CPT

## 2022-07-03 PROCEDURE — 86668 FRANCISELLA TULARENSIS: CPT

## 2022-07-03 PROCEDURE — 86141 C-REACTIVE PROTEIN HS: CPT

## 2022-07-03 NOTE — DIAGNOSTIC IMAGING REPORT
INDICATION:  56-year-old male, bodyaches, dry cough, vomiting..  



TECHNIQUE:  Single view chest 2:03 PM.



CORRELATION STUDY:  09/06/2019



FINDINGS: 

The heart size, mediastinal configuration and pulmonary

vascularity are within normal limits.  

The lungs are clear with no consolidating infiltrate. There is no

significant effusion or pneumothorax. 



IMPRESSION: 

1. Negative chest.



Dictated by: 



  Dictated on workstation # [id number]
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
## 2022-07-04 ENCOUNTER — HOSPITAL ENCOUNTER (EMERGENCY)
Dept: HOSPITAL 75 - ER | Age: 57
Discharge: HOME | End: 2022-07-04
Payer: SELF-PAY

## 2022-07-04 VITALS — WEIGHT: 143.74 LBS | BODY MASS INDEX: 22.56 KG/M2 | HEIGHT: 66.97 IN

## 2022-07-04 VITALS — DIASTOLIC BLOOD PRESSURE: 79 MMHG | SYSTOLIC BLOOD PRESSURE: 104 MMHG

## 2022-07-04 DIAGNOSIS — Z28.311: ICD-10-CM

## 2022-07-04 DIAGNOSIS — B34.9: Primary | ICD-10-CM

## 2022-07-04 DIAGNOSIS — E86.0: ICD-10-CM

## 2022-07-04 DIAGNOSIS — F17.210: ICD-10-CM

## 2022-07-04 DIAGNOSIS — Z20.822: ICD-10-CM

## 2022-07-04 LAB
ALBUMIN SERPL-MCNC: 4 GM/DL (ref 3.2–4.5)
ALP SERPL-CCNC: 74 U/L (ref 40–136)
ALT SERPL-CCNC: 33 U/L (ref 0–55)
APTT PPP: YELLOW S
BACTERIA #/AREA URNS HPF: (no result) /HPF
BARBITURATES UR QL: NEGATIVE
BASOPHILS # BLD AUTO: 0 10^3/UL (ref 0–0.1)
BASOPHILS NFR BLD AUTO: 0 % (ref 0–10)
BENZODIAZ UR QL SCN: NEGATIVE
BILIRUB SERPL-MCNC: 0.7 MG/DL (ref 0.1–1)
BILIRUB UR QL STRIP: (no result)
BUN/CREAT SERPL: 25
CALCIUM SERPL-MCNC: 9.3 MG/DL (ref 8.5–10.1)
CHLORIDE SERPL-SCNC: 103 MMOL/L (ref 98–107)
CO2 SERPL-SCNC: 20 MMOL/L (ref 21–32)
COCAINE UR QL: NEGATIVE
CREAT SERPL-MCNC: 1.06 MG/DL (ref 0.6–1.3)
EOSINOPHIL # BLD AUTO: 0 10^3/UL (ref 0–0.3)
EOSINOPHIL NFR BLD AUTO: 0 % (ref 0–10)
ERYTHROCYTE [SEDIMENTATION RATE] IN BLOOD: 10 MM/HR (ref 0–30)
FIBRINOGEN PPP-MCNC: CLEAR MG/DL
GFR SERPLBLD BASED ON 1.73 SQ M-ARVRAT: 82 ML/MIN
GLUCOSE SERPL-MCNC: 111 MG/DL (ref 70–105)
GLUCOSE UR STRIP-MCNC: NEGATIVE MG/DL
HCT VFR BLD CALC: 45 % (ref 40–54)
HGB BLD-MCNC: 15.4 G/DL (ref 13.3–17.7)
KETONES UR QL STRIP: (no result)
LEUKOCYTE ESTERASE UR QL STRIP: NEGATIVE
LYMPHOCYTES # BLD AUTO: 2.2 10^3/UL (ref 1–4)
LYMPHOCYTES NFR BLD AUTO: 32 % (ref 12–44)
MAGNESIUM SERPL-MCNC: 2.1 MG/DL (ref 1.6–2.4)
MANUAL DIFFERENTIAL PERFORMED BLD QL: NO
MCH RBC QN AUTO: 31 PG (ref 25–34)
MCHC RBC AUTO-ENTMCNC: 34 G/DL (ref 32–36)
MCV RBC AUTO: 89 FL (ref 80–99)
METHADONE UR QL SCN: NEGATIVE
MONOCYTES # BLD AUTO: 0.7 10^3/UL (ref 0–1)
MONOCYTES NFR BLD AUTO: 10 % (ref 0–12)
NEUTROPHILS # BLD AUTO: 3.9 10^3/UL (ref 1.8–7.8)
NEUTROPHILS NFR BLD AUTO: 57 % (ref 42–75)
NITRITE UR QL STRIP: NEGATIVE
OPIATES UR QL SCN: NEGATIVE
OXYCODONE UR QL: NEGATIVE
PH UR STRIP: 6 [PH] (ref 5–9)
PLATELET # BLD: 119 10^3/UL (ref 130–400)
PMV BLD AUTO: 11 FL (ref 9–12.2)
POTASSIUM SERPL-SCNC: 4.4 MMOL/L (ref 3.6–5)
PROPOXYPH UR QL: NEGATIVE
PROT SERPL-MCNC: 6.8 GM/DL (ref 6.4–8.2)
PROT UR QL STRIP: (no result)
RBC #/AREA URNS HPF: (no result) /HPF
RENAL EPI CELLS #/AREA URNS HPF: (no result) /HPF
SODIUM SERPL-SCNC: 137 MMOL/L (ref 135–145)
SP GR UR STRIP: >=1.03 (ref 1.02–1.02)
SQUAMOUS #/AREA URNS HPF: (no result) /HPF
TRICYCLICS UR QL SCN: NEGATIVE
WBC # BLD AUTO: 6.9 10^3/UL (ref 4.3–11)
WBC #/AREA URNS HPF: (no result) /HPF

## 2022-07-04 PROCEDURE — 80320 DRUG SCREEN QUANTALCOHOLS: CPT

## 2022-07-04 PROCEDURE — 93041 RHYTHM ECG TRACING: CPT

## 2022-07-04 PROCEDURE — 84145 PROCALCITONIN (PCT): CPT

## 2022-07-04 PROCEDURE — 85652 RBC SED RATE AUTOMATED: CPT

## 2022-07-04 PROCEDURE — 80306 DRUG TEST PRSMV INSTRMNT: CPT

## 2022-07-04 PROCEDURE — 74177 CT ABD & PELVIS W/CONTRAST: CPT

## 2022-07-04 PROCEDURE — 36415 COLL VENOUS BLD VENIPUNCTURE: CPT

## 2022-07-04 PROCEDURE — 87636 SARSCOV2 & INF A&B AMP PRB: CPT

## 2022-07-04 PROCEDURE — 87040 BLOOD CULTURE FOR BACTERIA: CPT

## 2022-07-04 PROCEDURE — 85025 COMPLETE CBC W/AUTO DIFF WBC: CPT

## 2022-07-04 PROCEDURE — 83605 ASSAY OF LACTIC ACID: CPT

## 2022-07-04 PROCEDURE — 80053 COMPREHEN METABOLIC PANEL: CPT

## 2022-07-04 PROCEDURE — 87088 URINE BACTERIA CULTURE: CPT

## 2022-07-04 PROCEDURE — 86141 C-REACTIVE PROTEIN HS: CPT

## 2022-07-04 PROCEDURE — 71045 X-RAY EXAM CHEST 1 VIEW: CPT

## 2022-07-04 PROCEDURE — 83735 ASSAY OF MAGNESIUM: CPT

## 2022-07-04 PROCEDURE — 81000 URINALYSIS NONAUTO W/SCOPE: CPT

## 2022-07-04 NOTE — DIAGNOSTIC IMAGING REPORT
EXAMINATION: Chest 1 view



HISTORY: Cough. Fever. Vomiting.



COMPARISON: 07/03/2022.



FINDINGS: 



The lung volumes are normal. No focal consolidation is seen. No

large pleural effusion or pneumothorax is seen. The

cardiomediastinal silhouette is normal in size and contour. No

acute osseous abnormality is seen.



IMPRESSION: 



1. No acute pleuroparenchymal process.



Dictated by: 



  Dictated on workstation # [id number]
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
## 2022-07-04 NOTE — DIAGNOSTIC IMAGING REPORT
EXAMINATION: CT abdomen and pelvis with intravenous contrast.



TECHNIQUE: Multiple contiguous axial images were obtained through

the abdomen and pelvis after the uneventful administration of

intravenous contrast. All CT scans use one or more of the

following dose optimizing techniques: automated exposure control,

MA and/or KvP adjustment based on patient size and exam type or

iterative reconstruction. 



HISTORY: Abdominal pain. Right lower quadrant pain.



COMPARISON: 03/29/2015.



FINDINGS:



The heart is unremarkable. The included lung bases are clear.  



The liver, spleen, pancreas, adrenal glands, and kidneys have a

normal appearance. There is no pathologically enlarged mesenteric

or retroperitoneal adenopathy. 



The bowel loops are nondilated. The appendix is visualized in the

right lower quadrant and demonstrates a normal caliber with air

in the appendix lumen. An appendicolith is visualized within the

distal appendix. Scattered diverticula are seen in the descending

and sigmoid colon without evidence of acute diverticulitis. There

is no free fluid or free air. 



No acute osseous abnormalities. There is calcified aortic and

iliac atherosclerotic plaque without aneurysm.



Ureters and bladder are grossly normal. There is no free air,

loculated collection, or adenopathy in the pelvis. 



IMPRESSION:

1. Normal caliber of the appendix. No periappendicular fat

stranding. An appendicolith is seen at the tip of the appendix.

2. Scattered diverticula without evidence of acute

diverticulitis.



Dictated by: 



  Dictated on workstation # [id number]

## 2023-04-19 ENCOUNTER — HOSPITAL ENCOUNTER (EMERGENCY)
Dept: HOSPITAL 75 - ER | Age: 58
Discharge: HOME | End: 2023-04-19
Payer: SELF-PAY

## 2023-04-19 VITALS — DIASTOLIC BLOOD PRESSURE: 83 MMHG | SYSTOLIC BLOOD PRESSURE: 125 MMHG

## 2023-04-19 VITALS — WEIGHT: 144.84 LBS | BODY MASS INDEX: 22.73 KG/M2 | HEIGHT: 66.93 IN

## 2023-04-19 DIAGNOSIS — Z28.311: ICD-10-CM
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
## 2023-04-19 DIAGNOSIS — S61.411A: Primary | ICD-10-CM

## 2023-04-19 PROCEDURE — 12011 RPR F/E/E/N/L/M 2.5 CM/<: CPT
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
